# Patient Record
Sex: FEMALE | Race: BLACK OR AFRICAN AMERICAN | NOT HISPANIC OR LATINO | Employment: UNEMPLOYED | URBAN - METROPOLITAN AREA
[De-identification: names, ages, dates, MRNs, and addresses within clinical notes are randomized per-mention and may not be internally consistent; named-entity substitution may affect disease eponyms.]

---

## 2017-01-06 ENCOUNTER — CLINICAL SUPPORT (OUTPATIENT)
Dept: PEDIATRIC CARDIOLOGY | Facility: CLINIC | Age: 42
End: 2017-01-06
Payer: MEDICAID

## 2017-01-06 ENCOUNTER — OFFICE VISIT (OUTPATIENT)
Dept: PEDIATRIC CARDIOLOGY | Facility: CLINIC | Age: 42
End: 2017-01-06
Attending: PEDIATRICS
Payer: MEDICAID

## 2017-01-06 VITALS
BODY MASS INDEX: 38.09 KG/M2 | HEIGHT: 68 IN | SYSTOLIC BLOOD PRESSURE: 114 MMHG | DIASTOLIC BLOOD PRESSURE: 68 MMHG | WEIGHT: 251.31 LBS | HEART RATE: 83 BPM

## 2017-01-06 DIAGNOSIS — Q21.20 AV CANAL: Primary | ICD-10-CM

## 2017-01-06 DIAGNOSIS — O35.8XX0 OTHER KNOWN OR SUSPECTED FETAL ABNORMALITY, NOT ELSEWHERE CLASSIFIED, AFFECTING MANAGEMENT OF MOTHER, ANTEPARTUM CONDITION OR COMPLICATION: Primary | ICD-10-CM

## 2017-01-06 DIAGNOSIS — O35.8XX0 OTHER KNOWN OR SUSPECTED FETAL ABNORMALITY, NOT ELSEWHERE CLASSIFIED, AFFECTING MANAGEMENT OF MOTHER, ANTEPARTUM CONDITION OR COMPLICATION: ICD-10-CM

## 2017-01-06 PROCEDURE — 99213 OFFICE O/P EST LOW 20 MIN: CPT | Mod: 25,S$PBB,, | Performed by: PEDIATRICS

## 2017-01-06 PROCEDURE — 76828 ECHO EXAM OF FETAL HEART: CPT | Mod: 26,S$PBB,, | Performed by: PEDIATRICS

## 2017-01-06 PROCEDURE — 76825 ECHO EXAM OF FETAL HEART: CPT | Mod: PBBFAC | Performed by: PEDIATRICS

## 2017-01-06 PROCEDURE — 99213 OFFICE O/P EST LOW 20 MIN: CPT | Mod: PBBFAC | Performed by: PEDIATRICS

## 2017-01-06 PROCEDURE — 76826 ECHO EXAM OF FETAL HEART: CPT | Mod: PBBFAC | Performed by: PEDIATRICS

## 2017-01-06 PROCEDURE — 93325 DOPPLER ECHO COLOR FLOW MAPG: CPT | Mod: 26,S$PBB,, | Performed by: PEDIATRICS

## 2017-01-06 PROCEDURE — 93325 DOPPLER ECHO COLOR FLOW MAPG: CPT | Mod: PBBFAC | Performed by: PEDIATRICS

## 2017-01-06 PROCEDURE — 99999 PR PBB SHADOW E&M-EST. PATIENT-LVL III: CPT | Mod: PBBFAC,,, | Performed by: PEDIATRICS

## 2017-01-06 PROCEDURE — 76826 ECHO EXAM OF FETAL HEART: CPT | Mod: 26,S$PBB,, | Performed by: PEDIATRICS

## 2017-01-06 RX ORDER — NAPROXEN SODIUM 220 MG/1
81 TABLET, FILM COATED ORAL DAILY
Status: ON HOLD | COMMUNITY
End: 2017-04-02 | Stop reason: HOSPADM

## 2017-01-10 NOTE — PROGRESS NOTES
"Ms. Russell  is a 41 y.o. year old  , referred by Dr. Nunes because of the history of type II diabetes mellitus.  This patient was first seen in our fetal clinic in [place] on 2016.  Although this was a technically difficult study, it was our impression that Ms. Russell had an abnormal fetal echocardiogram with findings suggestive of a balanced AV canal defect (most likely) or an isolated inlet VSD.  She returned to our [facility] clinic today for scheduled follow up.    The patient presented at approximately 26 3/7weeks gestation.  The patient denied any complaints.  Free fetal DNA screen was CONSISTENT WITH TRISOMY 21    Past medical history:  Type II diabetes mellitus.  Past surgical history: S/P C/S x 2.  Past gestational history: The patient has two healthy children.  She also has a history of one ectopic pregnancy and two second trimester IUFD's.    Family history: Negative for congenital heart disease, and sudden death during childhood.    Medications:   Outpatient Encounter Prescriptions as of 2017   Medication Sig Dispense Refill    ACCU-CHEK FASTCLIX Misc       aspirin 81 MG Chew Take 81 mg by mouth once daily.      BD INSULIN SYRINGE HALF UNIT 0.3 mL 31 gauge x 5/16" Syrg       BD INSULIN SYRINGE HALF UNIT 0.3 mL 31 gauge x 5/16" Syrg       NESTABS 32-1,000 mg-mcg Tab       NOVOLIN N 100 unit/mL injection       NOVOLIN R 100 unit/mL injection       ONETOUCH ULTRA TEST Strp       ONETOUCH ULTRAMINI kit        No facility-administered encounter medications on file as of 2017.        Allergies: Review of patient's allergies indicates no known allergies.    Blood pressure 114/68, pulse 83, height 5' 7.91" (1.725 m), weight 114 kg (251 lb 5.2 oz).    Fetal echocardiogram was again technically difficult due to patient size and fetal position.  A four chamber fetal heart with situs solitus was seen.  The ventricles appeared to be equal in size.  The contractility of both " ventricles was good.  The fetal heart rate was within the normal range, and regular.  There appeared to be a balanced AV canal defect with large inlet VSD and small septum primum ASD.  There were normally related great arteries seen.  The ductal and aortic arch were visualized, and appeared to be patent.  There was no pleural or pericardial effusion seen.    Doppler analysis revealed a three vessel umbilical cord, with normal flow patterns, and velocities by Doppler.  There was a normal flow pattern seen in the ductus venosus.  There was evidence of normal systemic, and pulmonary venous return seen.  There were normal inflow patterns seen across the AV-valves, without significant insufficiency.  The right and left ventricular outflow tract, and ductal and aortic arch appeared to be unobstructed.    Impression:  Although this was again a technically difficult study, it is our impression that Ms. Russell had an abnormal fetal echocardiogram with findings suggestive of a balanced AV canal defect.  As you know, small defects, and coarctation of the aorta cannot always be ruled out on fetal echocardiogram.  We discussed our findings with the patient and her partner, reviewed our images, and answered their questions. We also discussed the limitations of fetal echocardiography.  We discussed the more common combination of Down syndrome and endocardial cushion defect.  We briefly discussed the likely course in the  period and the possibility of elective surgery between 4 and 6 months of age, if the child feeds well and has adequate weight gain.  We suggested  follow up in our [facility] clinic in approximately 6 weeks, but, of course, we will always be available to reevaluate this patient sooner, if needed.  Time spent: 40 minutes, 50% dedicated to counseling.

## 2017-01-31 ENCOUNTER — DOCUMENTATION ONLY (OUTPATIENT)
Dept: OBSTETRICS AND GYNECOLOGY | Facility: HOSPITAL | Age: 42
End: 2017-01-31

## 2017-01-31 NOTE — PROGRESS NOTES
[facility]    Indication: Fetal heart defect - ffDNA consistent with Trisomy 21  Abnormal maternal serum screen  Type 2 DM  AMA  Two second trimester IUFD  Prior C/S x 2  Prior wound dehiscience  BMI 33.   ____________________________________________________________________________  History: Age: 42 years. Maternal age at EDC: 42 years. : 6 Para: 2. Previous pregnancies: Living children: 2.   Current Pregnancy: Pre- pregnancy data: Weight 232 lbs. Height 5 ft 10 ins. BMI 33.3.  Obstetric History: Mode of last delivery:  Section.  ____________________________________________________________________________  Dating:  Stated EDC:  EDC: 2017 GA by stated EDC: 30w0d  Current Scan on: 2017 EDC: 04/10/2017 GA by current scan: 30w1d  Best Overall Assessment: 2017 EDC: 2017 Assessed GA: 30w0d  The calculation of the gestational age by current scan was based on BPD, OFD, HC, AC and FL.  The Best Overall Assessment is based on the stated EDC.  ____________________________________________________________________________  General Evaluation:  Fetal heart activity: present. Fetal heart rate: 158 bpm.   Presentation: cephalic.   Fetal movement: visible.   Amniotic Fluid: normal. HANS  11.8 cm.   Cord: 3 vessels.   Placenta: anterior.     ____________________________________________________________________________  Anatomy Scan:  Biometry:  Fetal Measurements used for the estimation of the gestational age are bolded.  BPD 74.8 mm 38th% 30w0d (29w2d to 30w5d) (Hadlock)  .7 mm 22nd% 30w2d (27w2d to 33w2d) (Hadlock)  .7 mm 52nd% 30w2d (29w2d to 31w2d) (Hadlock)  FL 59.1 mm 59th% 30w6d (27w6d to 33w6d) (Hadlock)  OFD 98.1 mm 34th% 29w3d  (Nicolaides)  HC/AC Ratio 1.057  36th%   (Nicolaides)  FL/AC Ratio 0.226  <5th%   (Hadlock)  BPD/FL Ratio 1.266  <5th%   (Nicolaides)  EFW (lbs/oz) 3 lbs 7 ozs (Hadlock (BPD-HC-AC-FL))  EFW (g) 1574 g 59th%    Fetal  "Anatomy:   Normal Abnormal Suboptimal Normal Abnormal Suboptimal  Head X o o Abdominal Wall X o o  Brain X o o GI- Tract X o o  Face o o o Kidneys X o o  Spine X o o Bladder X o o  Neck/Skin X o o Extremities o o o  Thorax X o o Skeleton X o o  Heart o X o Genitalia X MALE o o    Details: Face: Sub-optimal.   Heart:   Atrium-ventricular septal defect.  Extremities: Sub-optimal.    Summary of Ultrasound Findings:  U/S machine: SaveFans! 9.     ____________________________________________________________________________  Fetal Wellbeing Assessment:  Amniotic fluid: normal. HANS: 11.8 cm.   Biophysical Profile: Fetal body movements: normal (2), Fetal tone: normal (2), Fetal breathing movements: normal (2), Amniotic fluid volume: normal (2). Score 8 / 8.     ____________________________________________________________________________  Consultation:  Type of Consultation: Maternal Fetal Medicine Evaluation  Consultant: Shilo Nunes III, MD  FUV : Dr. Sawant for Type 2 DM, Two second trimester IUFD, AMA, Fetal heart defect - ffDNA consistent with Trisomy 21    40y/o   EDC 17; 30w    Chart reviewed  My last note is reviewed  Pt interviewed and examined  Ultrasound performed  Maternal Serum ffDNA screen consistent with TRISOMY 21  Interval problems- Abdominal pain- "sore feeling" all last night, pt was able to sleep  No leaking, bleeding or discharge  Good FM    Fetal Cards evaluation on 1//10/17  findings suggestive of a balanced AV canal defect (most likely)  more common combination of Down syndrome and endocardial cushion defect.   Follow-up appt next week    OB HX:   "Fabien" 10#4oz, C/S for failure to progress, baby on ABX for a few days after delivery,   - ectopic pregnancy   "Annalee" 7#, repeat C/S, In [facility] for 1 month prior to delivery for unstable BS, readmitted for wound dehiscence  - "Lloyd" stillborn at 6mth, about 500 grams; went to dr for no fetal movement,  induction of " "labor; nuchal cord x 4 2016- "Benjamin" stillborn at 4mth, < 400 grams; went to dr for no fetal movement, Pt reports "work up done"; no etiology found    Type 2 DM:  BS checks QID - did not bring her log  FBS - 118 this AM  After meals - 130-140  Insulin-   Novalin N 34u in am and 24u at night (increased) - pt adjusted  Novalin R 24u in am and 10u at meals (2-3 times per day) (increased)    Meds:  81mg aspirin  Nestabs    Surgical HX:  ectopic preg  C/S x 2    Social HX:  non-smoker  student- Business administration.  ____________________________________________________________________________  Maternal Assessment:  Followup examination.   Weight: 249 lb (113.0 kg), weight gained: 15 lbs (weight gained: 7.7 kg).   Blood pressure: 121 / 71 mmHg.   Pulse: 89 b/min.   ____________________________________________________________________________  Report Summary:  Impression:   Fetus with Trisomy 21 and AVCD  Type 2 DM in sub-optimal control  Reassurring fetal growth and surveillance,   Normal amniotic fluid  Hx of wound dehiscience.   Recommendations:   Continue efforts at glucose control  Pt will see Peds Cards next week and we will ask them to recommend best location for delivery  Would plan for delivery between 37-38 weeks  With your permission, we would like to re-evaluate fetal growth and well being and maternal health in 6 weeks    "

## 2017-02-07 ENCOUNTER — OFFICE VISIT (OUTPATIENT)
Dept: PEDIATRIC CARDIOLOGY | Facility: CLINIC | Age: 42
End: 2017-02-07
Attending: PEDIATRICS
Payer: MEDICAID

## 2017-02-07 VITALS
DIASTOLIC BLOOD PRESSURE: 66 MMHG | WEIGHT: 255.75 LBS | BODY MASS INDEX: 37.88 KG/M2 | HEART RATE: 83 BPM | HEIGHT: 69 IN | SYSTOLIC BLOOD PRESSURE: 115 MMHG

## 2017-02-07 DIAGNOSIS — O35.8XX0 OTHER KNOWN OR SUSPECTED FETAL ABNORMALITY, NOT ELSEWHERE CLASSIFIED, AFFECTING MANAGEMENT OF MOTHER, ANTEPARTUM CONDITION OR COMPLICATION: ICD-10-CM

## 2017-02-07 DIAGNOSIS — O35.8XX0 OTHER KNOWN OR SUSPECTED FETAL ABNORMALITY, NOT ELSEWHERE CLASSIFIED, AFFECTING MANAGEMENT OF MOTHER, ANTEPARTUM CONDITION OR COMPLICATION: Primary | ICD-10-CM

## 2017-02-07 PROCEDURE — 99213 OFFICE O/P EST LOW 20 MIN: CPT | Mod: 25,S$PBB,, | Performed by: PEDIATRICS

## 2017-02-07 PROCEDURE — 99212 OFFICE O/P EST SF 10 MIN: CPT | Mod: PBBFAC | Performed by: PEDIATRICS

## 2017-02-07 PROCEDURE — 99999 PR PBB SHADOW E&M-EST. PATIENT-LVL II: CPT | Mod: PBBFAC,,, | Performed by: PEDIATRICS

## 2017-02-07 PROCEDURE — 76827 ECHO EXAM OF FETAL HEART: CPT | Mod: 26,S$PBB,, | Performed by: PEDIATRICS

## 2017-02-07 PROCEDURE — 93325 DOPPLER ECHO COLOR FLOW MAPG: CPT | Mod: 26,S$PBB,, | Performed by: PEDIATRICS

## 2017-02-07 PROCEDURE — 76825 ECHO EXAM OF FETAL HEART: CPT | Mod: 26,S$PBB,, | Performed by: PEDIATRICS

## 2017-02-07 NOTE — LETTER
February 8, 2017      Other  [address]  [place]  [place] [address]           [facility]  [address] [place] [address]  Phone: [phone]          Patient: Stephanie Russell   MR Number: [id number]   YOB: 1975   Date of Visit: 2/7/2017       Dear Other:    Thank you for referring Stephanie Russell to me for evaluation. Attached you will find relevant portions of my assessment and plan of care.    If you have questions, please do not hesitate to call me. I look forward to following Stephanie Russell along with you.    Sincerely,    Jimbo Johnson MD    Enclosure  CC:  No Recipients    If you would like to receive this communication electronically, please contact [email or web address] or [phone] to request more information on [employer] access.    For providers and/or their staff who would like to refer a patient to [facility], please contact us through our one-stop-shop provider referral line, [facility], at [phone].    If you feel you have received this communication in error or would no longer like to receive these types of communications, please e-mail [email or web address]

## 2017-02-08 NOTE — PROGRESS NOTES
"Ms. Russell  is a 42 y.o. year old  , referred by Dr. Nunes because of the history of type II diabetes mellitus.  This patient was first seen in our fetal clinic in [place] on 2016 and more recently on 17 at our [facility] clinic.  Although these were technically difficult studies, it was our impression that Ms. Russell had an abnormal fetal echocardiogram with findings suggestive of a balanced AV canal defect.  She returned to our [place] clinic for scheduled follow up.    The patient presented at approximately 31 weeks gestation.  The patient denied any complaints.  Free fetal DNA screen was CONSISTENT WITH TRISOMY 21    Past medical history:  Type II diabetes mellitus.  Past surgical history: S/P C/S x 2.  Past gestational history: The patient has two healthy children.  She also has a history of one ectopic pregnancy and two second trimester IUFD's.    Family history: Negative for congenital heart disease, and sudden death during childhood.    Medications:   Outpatient Encounter Prescriptions as of 2017   Medication Sig Dispense Refill    ACCU-CHEK FASTCLIX Misc       aspirin 81 MG Chew Take 81 mg by mouth once daily.      BD INSULIN SYRINGE HALF UNIT 0.3 mL 31 gauge x 5/16" Syrg       BD INSULIN SYRINGE HALF UNIT 0.3 mL 31 gauge x 5/16" Syrg       NESTABS 32-1,000 mg-mcg Tab       NOVOLIN N 100 unit/mL injection       NOVOLIN R 100 unit/mL injection       ONETOUCH ULTRA TEST Strp       ONETOUCH ULTRAMINI kit        No facility-administered encounter medications on file as of 2017.        Allergies: Review of patient's allergies indicates no known allergies.    Blood pressure 115/66, pulse 83, height 5' 8.5" (1.74 m), weight 116 kg (255 lb 11.7 oz).    Fetal echocardiogram was again technically difficult due to patient size and fetal position.  A four chamber fetal heart with situs solitus was seen.  The ventricles appeared to be equal in size.  The contractility of both " ventricles was good.  The fetal heart rate was within the normal range, and regular.  There appeared to be a balanced AV canal defect with large inlet VSD and small septum primum ASD.  There were normally related great arteries seen.  The ductal and aortic arch could not be visualized.  There was no pleural or pericardial effusion seen.    Doppler analysis revealed a three vessel umbilical cord, with normal flow patterns, and velocities by Doppler.  There was a normal flow pattern seen in the ductus venosus.  There was evidence of normal systemic, and pulmonary venous return seen.  There were normal inflow patterns seen across the AV-valves, without significant insufficiency.    Impression:  Although this was again a technically difficult study, it is our impression that Ms. Russell had an abnormal fetal echocardiogram with findings suggestive of a balanced AV canal defect.  As you know, small defects, and coarctation of the aorta cannot always be ruled out on fetal echocardiogram.  We discussed our findings with the patient and her partner, reviewed our images, and answered their questions.  We discussed the more common combination of Down syndrome and endocardial cushion defect.  We briefly discussed the likely course in the  period and the possibility of elective surgery between 4 and 6 months of age, if the child feeds well and has adequate weight gain.  The patient is scheduled for delivery via  on 3/21, most likely at [facility].  Of course, the child should be evaluated by Pediatric Cardiology shortly after birth.  No further follow up is scheduled in our clinic, but, of course, we will always be available to reevaluate this patient, if needed.  Time spent: 30 minutes, 50% dedicated to counseling.

## 2017-03-07 PROBLEM — O09.523 ELDERLY MULTIGRAVIDA IN THIRD TRIMESTER: Status: ACTIVE | Noted: 2017-03-07

## 2017-03-07 PROBLEM — O35.13X0 FETAL TRISOMY 21 AFFECTING CARE OF MOTHER, ANTEPARTUM: Status: ACTIVE | Noted: 2017-03-07

## 2017-03-07 PROBLEM — O24.113 PRE-EXISTING TYPE 2 DIABETES MELLITUS DURING PREGNANCY IN THIRD TRIMESTER: Status: ACTIVE | Noted: 2017-03-07

## 2017-03-09 ENCOUNTER — TELEPHONE (OUTPATIENT)
Dept: MATERNAL FETAL MEDICINE | Facility: CLINIC | Age: 42
End: 2017-03-09

## 2017-03-09 NOTE — TELEPHONE ENCOUNTER
Spoke with patient this am and informed her of Date and Time of  on  at 7:00 am.  Patient instructed to take her PM dose of insulin and to have nothing eat or drink after midnight and to be on L&D for 5:30 am.  Patient verbalized her understanding.

## 2017-03-28 ENCOUNTER — ANESTHESIA EVENT (OUTPATIENT)
Dept: OBSTETRICS AND GYNECOLOGY | Facility: OTHER | Age: 42
End: 2017-03-28
Payer: MEDICAID

## 2017-03-28 ENCOUNTER — ANESTHESIA (OUTPATIENT)
Dept: OBSTETRICS AND GYNECOLOGY | Facility: OTHER | Age: 42
End: 2017-03-28
Payer: MEDICAID

## 2017-03-28 ENCOUNTER — HOSPITAL ENCOUNTER (INPATIENT)
Facility: OTHER | Age: 42
LOS: 5 days | Discharge: HOME | End: 2017-04-02
Attending: OBSTETRICS & GYNECOLOGY | Admitting: OBSTETRICS & GYNECOLOGY
Payer: MEDICAID

## 2017-03-28 ENCOUNTER — SURGERY (OUTPATIENT)
Age: 42
End: 2017-03-28

## 2017-03-28 ENCOUNTER — RESEARCH ENCOUNTER (OUTPATIENT)
Dept: RESEARCH | Facility: HOSPITAL | Age: 42
End: 2017-03-28

## 2017-03-28 DIAGNOSIS — O24.113 PRE-EXISTING TYPE 2 DIABETES MELLITUS DURING PREGNANCY IN THIRD TRIMESTER: ICD-10-CM

## 2017-03-28 DIAGNOSIS — Z98.891 STATUS POST REPEAT LOW TRANSVERSE CESAREAN SECTION: ICD-10-CM

## 2017-03-28 DIAGNOSIS — O35.8XX0 OTHER KNOWN OR SUSPECTED FETAL ABNORMALITY, NOT ELSEWHERE CLASSIFIED, AFFECTING MANAGEMENT OF MOTHER, ANTEPARTUM CONDITION OR COMPLICATION: Primary | ICD-10-CM

## 2017-03-28 DIAGNOSIS — O35.13X0 FETAL TRISOMY 21 AFFECTING CARE OF MOTHER, ANTEPARTUM, NOT APPLICABLE OR UNSPECIFIED FETUS: ICD-10-CM

## 2017-03-28 DIAGNOSIS — O35.13X0 TRISOMY 21 OF FETUS IN CURRENT SINGLETON PREGNANCY: ICD-10-CM

## 2017-03-28 DIAGNOSIS — O09.523 ELDERLY MULTIGRAVIDA IN THIRD TRIMESTER: ICD-10-CM

## 2017-03-28 PROBLEM — O34.219 HISTORY OF CESAREAN DELIVERY AFFECTING PREGNANCY: Status: ACTIVE | Noted: 2017-03-28

## 2017-03-28 LAB
ABO + RH BLD: NORMAL
BASOPHILS # BLD AUTO: 0.01 K/UL
BASOPHILS NFR BLD: 0.1 %
BLD GP AB SCN CELLS X3 SERPL QL: NORMAL
DIFFERENTIAL METHOD: ABNORMAL
EOSINOPHIL # BLD AUTO: 0.1 K/UL
EOSINOPHIL NFR BLD: 1.7 %
ERYTHROCYTE [DISTWIDTH] IN BLOOD BY AUTOMATED COUNT: 15.6 %
HBV SURFACE AG SERPL QL IA: NEGATIVE
HCT VFR BLD AUTO: 38.8 %
HGB BLD-MCNC: 13.2 G/DL
HIV 1+2 AB+HIV1 P24 AG SERPL QL IA: NEGATIVE
HIV1+2 IGG SERPL QL IA.RAPID: NEGATIVE
LYMPHOCYTES # BLD AUTO: 1.9 K/UL
LYMPHOCYTES NFR BLD: 27.6 %
MCH RBC QN AUTO: 27.7 PG
MCHC RBC AUTO-ENTMCNC: 34 %
MCV RBC AUTO: 82 FL
MONOCYTES # BLD AUTO: 0.6 K/UL
MONOCYTES NFR BLD: 7.8 %
NEUTROPHILS # BLD AUTO: 4.4 K/UL
NEUTROPHILS NFR BLD: 62.5 %
PLATELET # BLD AUTO: 245 K/UL
PMV BLD AUTO: 10 FL
POCT GLUCOSE: 102 MG/DL (ref 70–110)
POCT GLUCOSE: 59 MG/DL (ref 70–110)
POCT GLUCOSE: 64 MG/DL (ref 70–110)
POCT GLUCOSE: 66 MG/DL (ref 70–110)
POCT GLUCOSE: 67 MG/DL (ref 70–110)
POCT GLUCOSE: 74 MG/DL (ref 70–110)
POCT GLUCOSE: 78 MG/DL (ref 70–110)
RBC # BLD AUTO: 4.76 M/UL
WBC # BLD AUTO: 7.02 K/UL

## 2017-03-28 PROCEDURE — 85025 COMPLETE CBC W/AUTO DIFF WBC: CPT

## 2017-03-28 PROCEDURE — 87340 HEPATITIS B SURFACE AG IA: CPT

## 2017-03-28 PROCEDURE — 25000003 PHARM REV CODE 250: Performed by: STUDENT IN AN ORGANIZED HEALTH CARE EDUCATION/TRAINING PROGRAM

## 2017-03-28 PROCEDURE — 63600175 PHARM REV CODE 636 W HCPCS: Performed by: OBSTETRICS & GYNECOLOGY

## 2017-03-28 PROCEDURE — 63600175 PHARM REV CODE 636 W HCPCS: Performed by: ANESTHESIOLOGY

## 2017-03-28 PROCEDURE — 86900 BLOOD TYPING SEROLOGIC ABO: CPT

## 2017-03-28 PROCEDURE — 25000003 PHARM REV CODE 250: Performed by: ANESTHESIOLOGY

## 2017-03-28 PROCEDURE — 59514 CESAREAN DELIVERY ONLY: CPT | Mod: ,,, | Performed by: ANESTHESIOLOGY

## 2017-03-28 PROCEDURE — 37000009 HC ANESTHESIA EA ADD 15 MINS: Performed by: OBSTETRICS & GYNECOLOGY

## 2017-03-28 PROCEDURE — 63600175 PHARM REV CODE 636 W HCPCS

## 2017-03-28 PROCEDURE — 25000003 PHARM REV CODE 250: Performed by: OBSTETRICS & GYNECOLOGY

## 2017-03-28 PROCEDURE — 86703 HIV-1/HIV-2 1 RESULT ANTBDY: CPT

## 2017-03-28 PROCEDURE — 86592 SYPHILIS TEST NON-TREP QUAL: CPT

## 2017-03-28 PROCEDURE — 86701 HIV-1ANTIBODY: CPT

## 2017-03-28 PROCEDURE — 27800517 HC TRAY,EPIDURAL-CONTINUOUS: Performed by: ANESTHESIOLOGY

## 2017-03-28 PROCEDURE — 36000685 HC CESAREAN SECTION LEVEL I

## 2017-03-28 PROCEDURE — 59514 CESAREAN DELIVERY ONLY: CPT | Mod: AT,,, | Performed by: OBSTETRICS & GYNECOLOGY

## 2017-03-28 PROCEDURE — 11000001 HC ACUTE MED/SURG PRIVATE ROOM

## 2017-03-28 PROCEDURE — 86762 RUBELLA ANTIBODY: CPT

## 2017-03-28 PROCEDURE — 51702 INSERT TEMP BLADDER CATH: CPT

## 2017-03-28 PROCEDURE — 88307 TISSUE EXAM BY PATHOLOGIST: CPT | Mod: 26,,, | Performed by: PATHOLOGY

## 2017-03-28 PROCEDURE — 27201127 HC VACUUM EXTRACTOR

## 2017-03-28 PROCEDURE — 27200033

## 2017-03-28 PROCEDURE — 88307 TISSUE EXAM BY PATHOLOGIST: CPT | Performed by: PATHOLOGY

## 2017-03-28 PROCEDURE — 37000008 HC ANESTHESIA 1ST 15 MINUTES: Performed by: OBSTETRICS & GYNECOLOGY

## 2017-03-28 PROCEDURE — 86901 BLOOD TYPING SEROLOGIC RH(D): CPT

## 2017-03-28 RX ORDER — ONDANSETRON 8 MG/1
8 TABLET, ORALLY DISINTEGRATING ORAL EVERY 8 HOURS PRN
Status: DISCONTINUED | OUTPATIENT
Start: 2017-03-28 | End: 2017-04-02 | Stop reason: HOSPADM

## 2017-03-28 RX ORDER — SODIUM CHLORIDE, SODIUM LACTATE, POTASSIUM CHLORIDE, CALCIUM CHLORIDE 600; 310; 30; 20 MG/100ML; MG/100ML; MG/100ML; MG/100ML
INJECTION, SOLUTION INTRAVENOUS CONTINUOUS PRN
Status: DISCONTINUED | OUTPATIENT
Start: 2017-03-28 | End: 2017-03-28

## 2017-03-28 RX ORDER — ACETAMINOPHEN 10 MG/ML
INJECTION, SOLUTION INTRAVENOUS
Status: DISCONTINUED | OUTPATIENT
Start: 2017-03-28 | End: 2017-03-28

## 2017-03-28 RX ORDER — MISOPROSTOL 200 UG/1
200 TABLET ORAL EVERY 6 HOURS
Status: DISCONTINUED | OUTPATIENT
Start: 2017-03-28 | End: 2017-03-29

## 2017-03-28 RX ORDER — OXYCODONE HYDROCHLORIDE 5 MG/1
5 TABLET ORAL EVERY 4 HOURS PRN
Status: DISCONTINUED | OUTPATIENT
Start: 2017-03-28 | End: 2017-04-02 | Stop reason: HOSPADM

## 2017-03-28 RX ORDER — GLUCAGON 1 MG
1 KIT INJECTION
Status: DISCONTINUED | OUTPATIENT
Start: 2017-03-28 | End: 2017-04-02 | Stop reason: HOSPADM

## 2017-03-28 RX ORDER — METOCLOPRAMIDE HYDROCHLORIDE 5 MG/ML
10 INJECTION INTRAMUSCULAR; INTRAVENOUS ONCE
Status: COMPLETED | OUTPATIENT
Start: 2017-03-28 | End: 2017-03-28

## 2017-03-28 RX ORDER — SODIUM CITRATE AND CITRIC ACID MONOHYDRATE 334; 500 MG/5ML; MG/5ML
30 SOLUTION ORAL ONCE
Status: COMPLETED | OUTPATIENT
Start: 2017-03-28 | End: 2017-03-28

## 2017-03-28 RX ORDER — MISOPROSTOL 200 UG/1
TABLET ORAL
Status: DISPENSED
Start: 2017-03-28 | End: 2017-03-28

## 2017-03-28 RX ORDER — ONDANSETRON 2 MG/ML
INJECTION INTRAMUSCULAR; INTRAVENOUS
Status: DISCONTINUED | OUTPATIENT
Start: 2017-03-28 | End: 2017-03-28

## 2017-03-28 RX ORDER — SODIUM CITRATE AND CITRIC ACID MONOHYDRATE 334; 500 MG/5ML; MG/5ML
SOLUTION ORAL
Status: DISPENSED
Start: 2017-03-28 | End: 2017-03-28

## 2017-03-28 RX ORDER — CEFAZOLIN SODIUM 2 G/50ML
2 SOLUTION INTRAVENOUS
Status: DISCONTINUED | OUTPATIENT
Start: 2017-03-28 | End: 2017-03-28

## 2017-03-28 RX ORDER — ONDANSETRON 2 MG/ML
4 INJECTION INTRAMUSCULAR; INTRAVENOUS EVERY 12 HOURS PRN
Status: DISCONTINUED | OUTPATIENT
Start: 2017-03-28 | End: 2017-04-02 | Stop reason: HOSPADM

## 2017-03-28 RX ORDER — ACETAMINOPHEN 325 MG/1
650 TABLET ORAL EVERY 6 HOURS
Status: DISPENSED | OUTPATIENT
Start: 2017-03-28 | End: 2017-03-29

## 2017-03-28 RX ORDER — MISOPROSTOL 200 UG/1
800 TABLET ORAL
Status: DISCONTINUED | OUTPATIENT
Start: 2017-03-28 | End: 2017-03-28

## 2017-03-28 RX ORDER — SODIUM CHLORIDE, SODIUM LACTATE, POTASSIUM CHLORIDE, CALCIUM CHLORIDE 600; 310; 30; 20 MG/100ML; MG/100ML; MG/100ML; MG/100ML
INJECTION, SOLUTION INTRAVENOUS CONTINUOUS
Status: DISCONTINUED | OUTPATIENT
Start: 2017-03-28 | End: 2017-03-28

## 2017-03-28 RX ORDER — ZOLPIDEM TARTRATE 5 MG/1
5 TABLET ORAL NIGHTLY PRN
Status: DISCONTINUED | OUTPATIENT
Start: 2017-03-28 | End: 2017-04-02 | Stop reason: HOSPADM

## 2017-03-28 RX ORDER — DIPHENHYDRAMINE HCL 25 MG
25 CAPSULE ORAL EVERY 4 HOURS PRN
Status: DISCONTINUED | OUTPATIENT
Start: 2017-03-28 | End: 2017-04-02 | Stop reason: HOSPADM

## 2017-03-28 RX ORDER — FENTANYL CITRATE 50 UG/ML
INJECTION, SOLUTION INTRAMUSCULAR; INTRAVENOUS
Status: DISCONTINUED | OUTPATIENT
Start: 2017-03-28 | End: 2017-03-28

## 2017-03-28 RX ORDER — OXYCODONE AND ACETAMINOPHEN 10; 325 MG/1; MG/1
1 TABLET ORAL EVERY 4 HOURS PRN
Status: DISCONTINUED | OUTPATIENT
Start: 2017-03-29 | End: 2017-04-02 | Stop reason: HOSPADM

## 2017-03-28 RX ORDER — IBUPROFEN 200 MG
24 TABLET ORAL
Status: DISCONTINUED | OUTPATIENT
Start: 2017-03-28 | End: 2017-04-02 | Stop reason: HOSPADM

## 2017-03-28 RX ORDER — METHYLERGONOVINE MALEATE 0.2 MG/ML
INJECTION INTRAVENOUS
Status: DISCONTINUED
Start: 2017-03-28 | End: 2017-03-28 | Stop reason: WASHOUT

## 2017-03-28 RX ORDER — FAMOTIDINE 10 MG/ML
20 INJECTION INTRAVENOUS ONCE
Status: COMPLETED | OUTPATIENT
Start: 2017-03-28 | End: 2017-03-28

## 2017-03-28 RX ORDER — METOCLOPRAMIDE HYDROCHLORIDE 5 MG/ML
INJECTION INTRAMUSCULAR; INTRAVENOUS
Status: DISPENSED
Start: 2017-03-28 | End: 2017-03-28

## 2017-03-28 RX ORDER — OXYTOCIN 10 [USP'U]/ML
INJECTION, SOLUTION INTRAMUSCULAR; INTRAVENOUS
Status: COMPLETED
Start: 2017-03-28 | End: 2017-03-28

## 2017-03-28 RX ORDER — FAMOTIDINE 10 MG/ML
INJECTION INTRAVENOUS
Status: DISPENSED
Start: 2017-03-28 | End: 2017-03-28

## 2017-03-28 RX ORDER — IBUPROFEN 200 MG
16 TABLET ORAL
Status: DISCONTINUED | OUTPATIENT
Start: 2017-03-28 | End: 2017-04-02 | Stop reason: HOSPADM

## 2017-03-28 RX ORDER — PHENYLEPHRINE HYDROCHLORIDE 10 MG/ML
INJECTION INTRAVENOUS
Status: DISCONTINUED | OUTPATIENT
Start: 2017-03-28 | End: 2017-03-28

## 2017-03-28 RX ORDER — DEXTROSE, SODIUM CHLORIDE, SODIUM LACTATE, POTASSIUM CHLORIDE, AND CALCIUM CHLORIDE 5; .6; .31; .03; .02 G/100ML; G/100ML; G/100ML; G/100ML; G/100ML
INJECTION, SOLUTION INTRAVENOUS CONTINUOUS
Status: DISCONTINUED | OUTPATIENT
Start: 2017-03-28 | End: 2017-04-02 | Stop reason: HOSPADM

## 2017-03-28 RX ORDER — OXYCODONE AND ACETAMINOPHEN 5; 325 MG/1; MG/1
1 TABLET ORAL EVERY 4 HOURS PRN
Status: DISCONTINUED | OUTPATIENT
Start: 2017-03-29 | End: 2017-04-02 | Stop reason: HOSPADM

## 2017-03-28 RX ORDER — NAPROXEN SODIUM 220 MG/1
81 TABLET, FILM COATED ORAL DAILY
Status: DISCONTINUED | OUTPATIENT
Start: 2017-03-29 | End: 2017-03-29

## 2017-03-28 RX ORDER — CARBOPROST TROMETHAMINE 250 UG/ML
INJECTION, SOLUTION INTRAMUSCULAR
Status: DISCONTINUED
Start: 2017-03-28 | End: 2017-03-28 | Stop reason: WASHOUT

## 2017-03-28 RX ORDER — BUPIVACAINE HYDROCHLORIDE 7.5 MG/ML
INJECTION, SOLUTION EPIDURAL; RETROBULBAR
Status: DISCONTINUED | OUTPATIENT
Start: 2017-03-28 | End: 2017-03-28

## 2017-03-28 RX ORDER — KETOROLAC TROMETHAMINE 30 MG/ML
INJECTION, SOLUTION INTRAMUSCULAR; INTRAVENOUS
Status: DISCONTINUED | OUTPATIENT
Start: 2017-03-28 | End: 2017-03-28

## 2017-03-28 RX ORDER — IBUPROFEN 400 MG/1
800 TABLET ORAL EVERY 6 HOURS
Status: DISCONTINUED | OUTPATIENT
Start: 2017-03-29 | End: 2017-04-02 | Stop reason: HOSPADM

## 2017-03-28 RX ORDER — OXYCODONE HYDROCHLORIDE 5 MG/1
10 TABLET ORAL EVERY 4 HOURS PRN
Status: DISCONTINUED | OUTPATIENT
Start: 2017-03-28 | End: 2017-04-02 | Stop reason: HOSPADM

## 2017-03-28 RX ORDER — KETOROLAC TROMETHAMINE 30 MG/ML
30 INJECTION, SOLUTION INTRAMUSCULAR; INTRAVENOUS EVERY 6 HOURS
Status: COMPLETED | OUTPATIENT
Start: 2017-03-28 | End: 2017-03-29

## 2017-03-28 RX ORDER — AMOXICILLIN 250 MG
1 CAPSULE ORAL NIGHTLY PRN
Status: DISCONTINUED | OUTPATIENT
Start: 2017-03-28 | End: 2017-04-02 | Stop reason: HOSPADM

## 2017-03-28 RX ORDER — MORPHINE SULFATE 0.5 MG/ML
INJECTION, SOLUTION EPIDURAL; INTRATHECAL; INTRAVENOUS
Status: DISCONTINUED | OUTPATIENT
Start: 2017-03-28 | End: 2017-03-28

## 2017-03-28 RX ORDER — SIMETHICONE 80 MG
1 TABLET,CHEWABLE ORAL EVERY 6 HOURS PRN
Status: DISCONTINUED | OUTPATIENT
Start: 2017-03-28 | End: 2017-04-02 | Stop reason: HOSPADM

## 2017-03-28 RX ORDER — HYDROMORPHONE HYDROCHLORIDE 2 MG/ML
INJECTION, SOLUTION INTRAMUSCULAR; INTRAVENOUS; SUBCUTANEOUS
Status: DISCONTINUED | OUTPATIENT
Start: 2017-03-28 | End: 2017-03-28

## 2017-03-28 RX ADMIN — KETOROLAC TROMETHAMINE 30 MG: 30 INJECTION, SOLUTION INTRAMUSCULAR at 02:03

## 2017-03-28 RX ADMIN — MISOPROSTOL 200 MCG: 200 TABLET ORAL at 03:03

## 2017-03-28 RX ADMIN — METOCLOPRAMIDE 10 MG: 5 INJECTION, SOLUTION INTRAMUSCULAR; INTRAVENOUS at 06:03

## 2017-03-28 RX ADMIN — PHENYLEPHRINE HYDROCHLORIDE 100 MCG: 10 INJECTION INTRAVENOUS at 07:03

## 2017-03-28 RX ADMIN — OXYTOCIN 2 UNITS: 10 INJECTION, SOLUTION INTRAMUSCULAR; INTRAVENOUS at 07:03

## 2017-03-28 RX ADMIN — ACETAMINOPHEN 1000 MG: 10 INJECTION, SOLUTION INTRAVENOUS at 07:03

## 2017-03-28 RX ADMIN — OXYCODONE HYDROCHLORIDE 10 MG: 5 TABLET ORAL at 01:03

## 2017-03-28 RX ADMIN — KETOROLAC TROMETHAMINE 30 MG: 30 INJECTION, SOLUTION INTRAMUSCULAR at 08:03

## 2017-03-28 RX ADMIN — ACETAMINOPHEN 650 MG: 325 TABLET ORAL at 02:03

## 2017-03-28 RX ADMIN — SODIUM CHLORIDE, SODIUM LACTATE, POTASSIUM CHLORIDE, AND CALCIUM CHLORIDE: 600; 310; 30; 20 INJECTION, SOLUTION INTRAVENOUS at 08:03

## 2017-03-28 RX ADMIN — FAMOTIDINE 20 MG: 10 INJECTION INTRAVENOUS at 06:03

## 2017-03-28 RX ADMIN — SODIUM CHLORIDE, SODIUM LACTATE, POTASSIUM CHLORIDE, AND CALCIUM CHLORIDE: 600; 310; 30; 20 INJECTION, SOLUTION INTRAVENOUS at 06:03

## 2017-03-28 RX ADMIN — SODIUM CITRATE AND CITRIC ACID MONOHYDRATE 30 ML: 500; 334 SOLUTION ORAL at 06:03

## 2017-03-28 RX ADMIN — BUPIVACAINE HYDROCHLORIDE 1.6 ML: 7.5 INJECTION, SOLUTION EPIDURAL; RETROBULBAR at 06:03

## 2017-03-28 RX ADMIN — Medication 0.15 MG: at 06:03

## 2017-03-28 RX ADMIN — MISOPROSTOL 200 MCG: 200 TABLET ORAL at 09:03

## 2017-03-28 RX ADMIN — ACETAMINOPHEN 650 MG: 325 TABLET ORAL at 08:03

## 2017-03-28 RX ADMIN — SODIUM CHLORIDE, SODIUM LACTATE, POTASSIUM CHLORIDE, AND CALCIUM CHLORIDE: 600; 310; 30; 20 INJECTION, SOLUTION INTRAVENOUS at 07:03

## 2017-03-28 RX ADMIN — MISOPROSTOL 200 MCG: 200 TABLET ORAL at 10:03

## 2017-03-28 RX ADMIN — DIPHENHYDRAMINE HYDROCHLORIDE 25 MG: 25 CAPSULE ORAL at 03:03

## 2017-03-28 RX ADMIN — FENTANYL CITRATE 10 MCG: 50 INJECTION, SOLUTION INTRAMUSCULAR; INTRAVENOUS at 06:03

## 2017-03-28 RX ADMIN — AZITHROMYCIN MONOHYDRATE 500 MG: 500 INJECTION, POWDER, LYOPHILIZED, FOR SOLUTION INTRAVENOUS at 06:03

## 2017-03-28 RX ADMIN — ONDANSETRON 4 MG: 2 INJECTION INTRAMUSCULAR; INTRAVENOUS at 07:03

## 2017-03-28 RX ADMIN — KETOROLAC TROMETHAMINE 30 MG: 30 INJECTION, SOLUTION INTRAMUSCULAR; INTRAVENOUS at 08:03

## 2017-03-28 NOTE — PLAN OF CARE
Problem: Breastfeeding (Adult,Obstetrics,Pediatric)  Goal: Signs and Symptoms of Listed Potential Problems Will be Absent, Minimized or Managed (Breastfeeding)  Signs and symptoms of listed potential problems will be absent, minimized or managed by discharge/transition of care (reference Breastfeeding (Adult,Obstetrics,Pediatric) CPG).   Outcome: Ongoing (interventions implemented as appropriate)  Pumping for NICU infant.    03/28/17 1745   Breastfeeding   Problems Assessed (Breastfeeding) all   Problems Present (Breastfeeding) other (see comments)

## 2017-03-28 NOTE — PROGRESS NOTES
BG 59, Anesthesia resident recommended crackers, Pt reported feeling jittery, Dr Luna notified of pt status and ordered to give pt crackers/juice/peanutbutter.

## 2017-03-28 NOTE — PROGRESS NOTES
MD notified of blood glucose of 66 while awaiting lunch to be delivered. MD states to give lilibeth crackers and juice and have pt eat lunch. No further orders given. Will continue to monitor.

## 2017-03-28 NOTE — IP AVS SNAPSHOT
[facility] Location ([facility])  [address] [place], [place] [address]  Phone: [phone]           Patient Discharge Instructions   Our goal is to set you up for success. This packet includes information on your condition, medications, and your home care.  It will help you care for yourself to prevent having to return to the hospital.     Please ask your nurse if you have any questions.      There are many details to remember when preparing to leave the hospital. Here is what you will need to do:    1. Take your medicine. If you are prescribed medications, review your Medication List on the following pages. You may have new medications to  at the pharmacy and others that you'll need to stop taking. Review the instructions for how and when to take your medications. Talk with your doctor or nurses if you are unsure of what to do.     2. Go to your follow-up appointments. Specific follow-up information is listed in the following pages. Your may be contacted by a nurse or clinical provider about future appointments. Be sure we have all of the phone numbers to reach you. Please contact your provider's office if you are unable to make an appointment.     3. Watch for warning signs. Your doctor or nurse will give you detailed warning signs to watch for and when to call for assistance. These instructions may also include educational information about your condition. If you experience any of warning signs to your health, call your doctor.           [facility]  Unless otherwise directed by your provider, please   contact [facility], our nurse care line   that is available for 24/7 assistance.     [phone] (toll-free)     Registered nurses in the [facility]   provide: appointment scheduling, clinical advisement, health education, and other advisory services.                  ** Verify the list of medication(s) below is accurate and up to date. Carry this with you in case of  "emergency. If your medications have changed, please notify your healthcare provider.             Medication List      START taking these medications        Additional Info                      ibuprofen 800 MG tablet   Commonly known as:  ADVIL,MOTRIN   Quantity:  30 tablet   Refills:  0   Dose:  800 mg    Last time this was given:  800 mg on 4/2/2017  1:23 PM   Instructions:  Take 1 tablet (800 mg total) by mouth every 6 (six) hours as needed (cramping/pain).     Begin Date    AM    Noon    PM    Bedtime       oxycodone-acetaminophen 5-325 mg per tablet   Commonly known as:  PERCOCET   Quantity:  30 tablet   Refills:  0   Dose:  1 tablet    Instructions:  Take 1 tablet by mouth every 4 (four) hours as needed for Pain.     Begin Date    AM    Noon    PM    Bedtime         CONTINUE taking these medications        Additional Info                      ACCU-CHEK FASTCLIX Misc   Refills:  0   Generic drug:  lancets      Begin Date    AM    Noon    PM    Bedtime       * BD INSULIN SYRINGE HALF UNIT 0.3 mL 31 gauge x 5/16" Syrg   Refills:  0   Generic drug:  insulin syr/ndl U100 half renata      Begin Date    AM    Noon    PM    Bedtime       * BD INSULIN SYRINGE HALF UNIT 0.3 mL 31 gauge x 5/16" Syrg   Refills:  0   Generic drug:  insulin syr/ndl U100 half renata      Begin Date    AM    Noon    PM    Bedtime       NESTABS 32-1,000 mg-mcg Tab   Refills:  0   Generic drug:  prenatal vit#86-iron bisgly-FA      Begin Date    AM    Noon    PM    Bedtime       NOVOLIN N 100 unit/mL injection   Refills:  0   Generic drug:  insulin NPH    Last time this was given:  19 Units on 4/1/2017  9:17 PM     Begin Date    AM    Noon    PM    Bedtime       NOVOLIN R 100 unit/mL injection   Refills:  0   Generic drug:  insulin regular    Last time this was given:  20 Units on 3/30/2017  7:54 PM     Begin Date    AM    Noon    PM    Bedtime       ONETOUCH ULTRA TEST Strp   Refills:  0   Generic drug:  blood sugar diagnostic      Begin Date    AM "    Noon    PM    Bedtime       ONETOUCH ULTRAMINI kit   Refills:  0   Generic drug:  blood-glucose meter      Begin Date    AM    Noon    PM    Bedtime       * Notice:  This list has 2 medication(s) that are the same as other medications prescribed for you. Read the directions carefully, and ask your doctor or other care provider to review them with you.      STOP taking these medications     aspirin 81 MG Chew            Where to Get Your Medications      You can get these medications from any pharmacy     Bring a paper prescription for each of these medications     ibuprofen 800 MG tablet    oxycodone-acetaminophen 5-325 mg per tablet                  Please bring to all follow up appointments:    1. A copy of your discharge instructions.  2. All medicines you are currently taking in their original bottles.  3. Identification and insurance card.    Please arrive 15 minutes ahead of scheduled appointment time.    Please call 24 hours in advance if you must reschedule your appointment and/or time.        Follow-up Information     Follow up with Chanel Sawant MD In 1 week.    Specialty:  Obstetrics and Gynecology    Why:  BP check    Contact information:    [address] [address]  [phone]          Follow up with Chanel Sawant MD In 6 weeks.    Specialty:  Obstetrics and Gynecology    Why:  post partum    Contact information:    [address] [facility] [address]  [phone]          Discharge Instructions     Future Orders    Activity as tolerated     Call MD for:  difficulty breathing or increased cough     Call MD for:  increased confusion or weakness     Call MD for:  persistent dizziness, light-headedness, or visual disturbances     Call MD for:  persistent nausea and vomiting or diarrhea     Call MD for:  redness, tenderness, or signs of infection (pain, swelling, redness, odor or green/yellow discharge around incision site)     Call MD for:  severe persistent headache     Call MD for:  severe  uncontrolled pain     Call MD for:  temperature >100.4     Call MD for:  worsening rash     Diet general     Questions:    Total calories:      Fat restriction, if any:      Protein restriction, if any:      Na restriction, if any:      Fluid restriction:      Additional restrictions:          Discharge Instructions       Preparation and Hygiene:    1. Shower daily.  2. Wear a clean bra each day and wash daily in warm soapy water.  3. Change wet or moist breast pads frequently.  Moist pads can promote growth of germs.  4. Actively wash your hands, paying close attention to the area around and under your fingernails, thoroughly with soap and water for 15 seconds before pumping or handling your milk.  Re-wash your hands if you touch anything (scratching your nose, answering the phone, etc) while pumping or handling your milk.   5. Before pumping your breasts, assemble the pump collection kit and have ready the sterile container and labels.  Place these items on a clean surface next to the breastpump.  6. Each time after you have finished pumping, take apart all of the parts of the breastpump collection kit and place them in a separate cleaning container (do not place them in the sink).  Be sure to remove the yellow valve from the breastshield and separate the white membrane from the yellow valve.  Rinse all of these parts with cool water.  Then use a new sponge and/or bottle brush and dishwashing detergent to clean the parts.  Rinse off the soapy water with cool water and air dry on a clean towel covered with a clean cloth.  All parts may also be washed after each use in the top rack of a .  7. Once each day, sterilize all of the parts of the breastpump collection kit.  This can be done by boiling the kit parts for 10 minutes or by using a Quick Clean Micro-Steam Bag made by [employer]  8. If condensation appears in the tubing, continue to run the pump with the tubing attached for 1-2 minutes or until the  tubing is dry.   9. Notify your babys nurse or doctor if you become ill or need to take any medication, even over-the-counter medicines.        Collection and Storage of Expressed Breastmilk:         1. Pump your breasts at least 8-10 times every 24 hours.  Double pump (both breasts at  the same time) for at least 15-20 minutes using the most suction that is comfortable.    2. Write the date and time of pumping and the name of any medications you are takingon the babys pre-printed hospital identification label.   3. Place your babys pre-printed hospital identification label on each container of breastmilk.  Additional pre-printed labels can be obtained from your babys nurse.  If your expressed breastmilk is not correctly labeled, the nurse cannot feed the milk to the baby.       4. Place a brightly colored sticker on the top of each container of milk pumped during the first 30 days.  This identifies the milk as special and having higher levels of nutrients and anti-infective properties that are so important for your baby.  Additional stickers can be obtained from the lactation consultants or your babys nurse.  5.   Do not touch the inside of the storage containers or lids.  6.      Pour the amount of expressed milk needed for 1 of your babys feedings into each   storage container. Use a new container(s) for each pumping.  Additional storage   containers can be obtained from your babys nurse.        7.       Tightly screw the lid onto the container and place immediately into the       refrigerator fordaily transportation to the hospital.   Do not freeze your milk      unless asked to do so by your babys nurse.  However, if you are not able to      visit your baby each day, place the expressed breastmilk in the freezer.  8.   Expressed breastmilk should be refrigerated or frozen within 1 hour of      pumping.  9.      Do not store expressed breastmilk on the door of your refrigerator or freezer where the  temperature is warmer.         Transportation of Expressed Breastmilk:    1. Refrigerated breastmilk or frozen milk should be packed tightly together in a cooler with frozen, blue gel-packs to keep the milk frozen.  DO NOT USE ICE CUBES (WET ICE) TO TRANSPORT FROZEN MILK.   A clean towel can be used to fill any extra space between containers of frozen milk.  2.    Bring your expressed milk from home each time you visit the baby.                Breastfeeding Discharge Instructions       Feed the baby at the earliest sign of hunger or comfort  o Hands to mouth, sucking motions  o Rooting or searching for something to suck on  o Dont wait for crying - it is a sign of distress     The feedings may be 8-12 times per 24hrs and will not follow a schedule   Avoid pacifiers and bottles for the first 4 weeks   Alternate the breast you start the feeding with, or start with the breast that feels the fullest   Switch breasts when the baby takes himself off the breast or falls asleep   Keep offering breasts until the baby looks full, no longer gives hunger signs, and stays asleep when placed on his back in the crib   If the baby is sleepy and wont wake for a feeding, put the baby skin-to-skin dressed in a diaper against the mothers bare chest   Sleep near your baby   The baby should be positioned and latched on to the breast correctly  o Chest-to-chest, chin in the breast  o Babys lips are flipped outward  o Babys mouth is stretched open wide like a shout  o Babys sucking should feel like tugging to the mother  - The baby should be drinking at the breast:  o You should hear swallowing or gulping throughout the feeding  o You should see milk on the babys lips when he comes off the breast  o Your breasts should be softer when the baby is finished feeding  o The baby should look relaxed at the end of feedings  o After the 4th day and your milk is in:  o The babys poop should turn bright yellow and be loose,  watery, and seedy  o The baby should have at least 3-4 poops the size of the palm of your hand per day  o The baby should have at least 5-6 wet diapers per day  o The urine should be light yellow in color  You should drink when you are thirsty and eat a healthy diet when you are    hungry.     Take naps to get the rest you need.   Take medications and/or drink alcohol only with permission of your obstetrician    or the babys pediatrician.  You can also call the [facility] Risk [facility],   ([phone]), Monday-Friday, 8am-5pm Central time, to get the most   up-to-date evidence-based information on the use of medications during   pregnancy and breastfeeding.      The baby should be examined by a pediatrician at 3-5 days of age.  Once your   milk comes in, the baby should be gaining at least ½ - 1oz each day and should be back to birthweight no later than 10-14 days of age.          Community Resources    [facility] Breastfeeding Warmline: [phone]   Local [facility] clinics: provide incentives and breastpumps to eligible mothers  La Leche League International (LLLI):  mother-to-mother support group website        www.[employer] System[employer]Sens  Local La Leche League mother-to-mother support groups:        www.flikdate.[employer]        La Leche League [facility]   Dr. Vincent Boudreaux website for latch videos and general information:        www.breastfeedinginc.ca  [facility] Risk [facility] is a call center that provides information about the safety of taking medications while breastfeeding.  Call [phone], M-F, 8am-5pm, CT.  International Lactation Consultant Association provides resources for assistance:        www.ilca.org  [place] Breastfeeding Coalition provides informationand resources for parents  and the community    http://lou[place]ianabreastfeeding.org     Alycia Kelly is a mom-to-mom support group:                             www.nolanTomveyi Bidamon.[employer]elabraGames//breastfeedng-support/  Partners for Healthy Babies:  6-089-668-BABY(1966)  [employer]: a breastfeeding support group for women of color, 519.146.9174        Primary Diagnosis     Your primary diagnosis was:  Status Post Repeat Low Transverse  Section      Admission Information     Date & Time Provider Department CSN    3/28/2017  4:59 AM Shilo Nunes III, MD [facility] [id number]      Care Providers     Provider Role Specialty Primary office phone    Shilo Nunes III, MD Attending Provider Maternal and Fetal Medicine [phone]    Shilo Nunes III, MD Surgeon  Maternal and Fetal Medicine [phone]    Nathalie Ortiz MD Consulting Physician  Obstetrics and Gynecology [phone]      Your Vitals Were     BP Pulse Temp Resp Height Weight    129/65 82 98.5 °F (36.9 °C) (Oral) 18 5' 10" (1.778 m) 122 kg (269 lb)    SpO2 BMI             99% 38.6 kg/m2         Recent Lab Values     No lab values to display.      Pending Labs     Order Current Status    Specimen to Pathology - Surgery In process      Allergies as of 2017     No Known Allergies      Advance Directives     An advance directive is a document which, in the event you are no longer able to make decisions for yourself, tells your healthcare team what kind of treatment you do or do not want to receive, or who you would like to make those decisions for you.  If you do not currently have an advance directive, [facility] encourages you to create one.  For more information call:  [phone] ([phone]), [phone] ([phone]),  or log on to [email or web address].        Language Assistance Services     ATTENTION: Language assistance services are available, free of charge. Please call [phone].      ATENCIÓN: Si habla español, tiene a bourgeois disposición servicios gratuitos de asistencia lingüística. Llame al [phone].     CHÚ Ý: N?u b?n nói Ti?ng Vi?t, có các d?ch v? h? tr? ngôn ng? mi?n phí dành cho b?n. G?i s? [phone].        Diabetes Discharge Instructions     "                               [facility] Sign-Up     Activating your [facility] account is as easy as 1-2-3!     1) Visit [email or web address], select Sign Up Now, enter this activation code and your date of birth, then select Next.  [email or web address]  Expires: 5/17/2017  1:35 PM      2) Create a username and password to use when you visit [facility] in the future and select a security question in case you lose your password and select Next.    3) Enter your e-mail address and click Sign Up!    Additional Information  If you have questions, please e-mail [email or web address] or call [phone] to talk to our [facility] staff. Remember, [facility] is NOT to be used for urgent needs. For medical emergencies, dial 911.          [facility] complies with applicable Federal civil rights laws and does not discriminate on the basis of race, color, national origin, age, disability, or sex.

## 2017-03-28 NOTE — PROGRESS NOTES
I met Mrs. Russell and her  in L&D; she is here for a planned c/s. We reviewed the study in detail, including purpose, numbers/length, procedures, risk/benefit, cost/payment, alternatives/voluntary nature/withdrawal, confidentiality/HIPAA, contacts. She had a wound vac after a previous c/s when the incision opened up. She had no questions and willingly signed the consent, denying participation in any other studies and agreeing to future use of data. She was given a copy and one was placed in the medical record.  She was randomized to routine dressing.

## 2017-03-28 NOTE — ANESTHESIA PREPROCEDURE EVALUATION
2017  Stephanie Russell is a 42 y.o. female presents for repeat  secondary to two prior  deliveries and fetal trisomy 21 w/ AV canal defects. She has a hx of poorly controlled DMII managed with insulin. \    Prior general and neuraxial anesthesia. Describes difficult neuraxial placement, unknown cause.      OB History    Para Term  AB SAB TAB Ectopic Multiple Living   6 3 2 1 2 1  1  1      # Outcome Date GA Lbr Nikhil/2nd Weight Sex Delivery Anes PTL Lv   6 Current            5 SAB    0.4 kg (14.1 oz) M   Y FD   4     0.5 kg (1 lb 1.6 oz) M   Y FD      Complications: Nuchal cord   3 Term    3.175 kg (7 lb) F CS-LTranv      2 Ectopic            1 Term    4.649 kg (10 lb 4 oz) M   N Y      Complications: Failure to progress in labor          Wt Readings from Last 1 Encounters:   17 0513 122 kg (269 lb)       BP Readings from Last 3 Encounters:   17 128/77   17 115/66   17 114/68       Patient Active Problem List   Diagnosis    Trisomy 21 Fetus with AV Canal Defect    Elderly multigravida in third trimester    Fetal trisomy 21 affecting care of mother, antepartum    Pre-existing type 2 diabetes mellitus during pregnancy in third trimester    Trisomy 21 of fetus in current russell pregnancy    S/p RLTCS on 3/28       Past Surgical History:   Procedure Laterality Date     SECTION      ECTOPIC PREGNANCY SURGERY         Social History     Social History    Marital status:      Spouse name: N/A    Number of children: N/A    Years of education: N/A     Occupational History    Not on file.     Social History Main Topics    Smoking status: Never Smoker    Smokeless tobacco: Not on file    Alcohol use No    Drug use: No    Sexual activity: Yes     Other Topics Concern    Not on file     Social  History Narrative         Chemistry    No results found for: NA, K, CL, CO2, BUN, CREATININE, GLU No results found for: CALCIUM, ALKPHOS, AST, ALT, BILITOT         Lab Results   Component Value Date    WBC 7.02 03/28/2017    HGB 13.2 03/28/2017    HCT 38.8 03/28/2017    MCV 82 03/28/2017     03/28/2017       [facility] Anesthesia Evaluation    I have reviewed the Patient Summary Reports.     I have reviewed the Medications.     Review of Systems  Anesthesia Hx:  No problems with previous Anesthesia  History of prior surgery of interest to airway management or planning: Previous anesthesia: General, Epidural Denies Family Hx of Anesthesia complications.   Denies Personal Hx of Anesthesia complications.   Hematology/Oncology:  Hematology Normal   Oncology Normal     EENT/Dental:EENT/Dental Normal   Cardiovascular:  Cardiovascular Normal     Pulmonary:  Pulmonary Normal    Renal/:  Renal/ Normal     Hepatic/GI:  Hepatic/GI Normal    Musculoskeletal:  Musculoskeletal Normal    Neurological:  Neurology Normal    Endocrine:   Diabetes, poorly controlled, type 2, using insulin    Psych:  Psychiatric Normal           Physical Exam  General:  Well nourished, Obesity    Airway/Jaw/Neck:  Airway Findings: Mouth Opening: Normal Tongue: Normal  General Airway Assessment: Adult  Mallampati: IV  Improves to III with phonation.  TM Distance: Normal, at least 6 cm      Dental:  Dental Findings: In tact   Chest/Lungs:  Chest/Lungs Findings: Normal Respiratory Rate     Heart/Vascular:  Heart Findings: Rate: Normal  Rhythm: Regular Rhythm  Vascular Findings:  Vascular Access: Peripheral IV(s)        Mental Status:  Mental Status Findings:  Cooperative, Alert and Oriented         Anesthesia Plan  Type of Anesthesia, risks & benefits discussed:  Anesthesia Type:  CSE, epidural, general, spinal  Patient's Preference:   Intra-op Monitoring Plan: standard ASA monitors  Intra-op Monitoring Plan Comments:   Post Op Pain Control Plan:    Post Op Pain Control Plan Comments:   Induction:   IV  Beta Blocker:  Patient is not currently on a Beta-Blocker (No further documentation required).       Informed Consent: Patient understands risks and agrees with Anesthesia plan.  Questions answered. Anesthesia consent signed with patient.  ASA Score: 3     Day of Surgery Review of History & Physical:    H&P update referred to the surgeon.     Anesthesia Plan Notes: NPO checked, plan for CSE.         Ready For Surgery From Anesthesia Perspective.

## 2017-03-28 NOTE — PROGRESS NOTES
Mother would like to pump for her baby. Mother encouraged to provide  breastmilk by pumping.Benefits of breastmilk discussed. Breastpump initiated. Mother educated on pump use, cleaning pump parts, labeling containers and storage of breastmilk. Mother to call her nurse with further questions.

## 2017-03-28 NOTE — L&D DELIVERY NOTE
Section Operative Note  Procedure Date: 2017     Procedure: Repeat classical  Section via pfannensteil skin incision    Indications: history of c/s x2    Pre-operative Diagnosis: IUP at 38 week 0 day pregnancy    Post-operative Diagnosis: same    Surgeon: Dr. Nunes     Assistants: Preston Isaacs MD (res)    Anesthesia: Spinal anesthesia    Findings:   1. Large gravid uterus  2. Male infant in transverse orientation  3.  Normal tubes and ovaries  4. Dense fascia    Estimated Blood Loss:  1300mL           Total IV Fluids: 2000 mL     UOP: 200 mL    Specimens: single viable male infant , Placenta which was sent to pathology    PreOp CBC:   Lab Results   Component Value Date    WBC 7.02 2017    HGB 13.2 2017    HCT 38.8 2017    MCV 82 2017     2017                     Complications:  None; patient tolerated the procedure well.           Disposition: PACU - hemodynamically stable.           Condition: stable    Procedure Details   The patient was seen in the Holding Room. The risks, benefits, complications, treatment options, and expected outcomes were discussed with the patient.  The patient concurred with the proposed plan, giving informed consent.  The site of surgery properly noted/marked. The patient was taken to Operating Room, identified as Stephanie Russell and the procedure verified as  Delivery. A Time Out was held and the above information confirmed.    After induction of anesthesia, the patient was prepped and draped in the usual sterile manner while placed in a dorsal supine position with a left lateral tilt.  A montes catheter was also placed per nursing. Preoperative antibiotics were administered and an allis test was performed yielding adequate anesthesia.  A Pfannenstiel incision was made and carried down through the subcutaneous tissue to the fascia. Fascial incision was made and extended transversely. The fascia was grasped  with Kocher clamps and  from the underlying rectus tissue superiorly and inferiorly. The peritoneum was identified, found to be free of adherent bowl and entered bluntly. Peritoneal incision was extended longitudinally. The vesico-uterine peritoneum was identified and bladder blade was inserted. The vesico-uterine peritoneum was incised transversely and the bladder flap was bluntly freed from the lower uterine segment. The bladder blade was reinserted to keep the bladder out of the operative field. A classical uterine incision was made with knife and extended bluntly. The amniotic sac was ruptured on entry and the infant was noted to be in transverse position.  The feet were grabbed and used to rotate the infant to a cephalic position. The head was brought to the incision and elevated out of the pelvis. The patient delivered a single viable male infant with mild difficulty.  Infant weighed 3714 grams with Apgar scores of 4/9 at one and five minutes respectively. After the umbilical cord was clamped and cut cord blood was obtained for evaluation. The placenta was removed intact and appeared normal and was sent to pathology. The uterus was exteriorized. The uterine outline, tubes and ovaries appeared normal. The uterine incision was closed with running locked sutures of #1 chromic followed by two embrocating layers of #1 chromic. Sudheer was placed above the bladder flap.  Hemostasis was observed. The uterus was returned to the abdominal cavity. Incision was reinspected and good hemostasis was noted. The abdominal cavity was irrigated to remove clots. The peritoneum and was reapproximated with 2-0 vicryl. The fascia was then reapproximated with running sutures of #1 PDS. The skin was reapproximated with 4-0 monocryl.    Instrument, sponge, and needle counts were correct prior the abdominal closure and at the conclusion of the case.     Pt tolerated procedure well and Dr. Nunes discussed with family that pt  was stable and in good condition after the procedure.    Delivery Information for  Pedro Brown Russell    Birth information:  YOB: 2016   Time of birth: 7:21 AM   Sex: male   Head Delivery Date/Time: 3/28/2017  7:21 AM   Delivery type: , Classical   Gestational Age: 38w0d    Delivery Providers    Delivering clinician:  MODESTO PANIAGUA III   Other personnel:   Provider Role   DEANDRE SPARROW ALISON Registered Nurse   RAUL DAO Surgical Tech                  Eloy Measurements    Weight:  3714 g Length:  51.4 cm   Head circum.:  34.9 cm Chest circum.:  32.4 cm          Eloy Assessment    Living status:  Yes   Apgars    1 Minute:   5 Minute:   10 Minute 15 Minute 20 Minute   Skin Color: 0  1       Heart Rate: 2  2       Reflex Irritability: 0  2       Muscle Tone: 1  2       Respiratory Effort: 1  2       Total: 4  9                  Apgars Assigned By:  NICU ATTENDED          Assisted Delivery Details:    Forceps attempted?:  No   Vacuum extractor attempted?:  No             Shoulder Dystocia    Shoulder dystocia present?:  No                                             Presentation and Position    Presentation: Transverse   Position:                    Interventions/Resuscitation    Method:  NICU Attended        Cord    Vessels:  3 vessels   Complications:  None   Delayed Cord Clamping?:  No   Cord Clamped Date/Time:  3/28/2017  7:22 AM   Cord Blood Disposition:  Sent with Baby   Gases Sent?:  Yes   Stem Cell Collection (by MD):  No        Placenta    Date and time:  3/28/2017  7:24 AM   Removal:  Manual removal   Appearance:  Intact   Placenta disposition:  Pathology            Labor Events:       labor: No     Labor Onset Date/Time:         Dilation Complete Date/Time:         Start Pushing Date/Time:       Rupture Date/Time:              Rupture type:           Fluid Amount:        Fluid Color:        Fluid Odor:        Membrane Status  (PeriCalm):        Rupture Date/Time (PeriCalm):        Fluid Amount (PeriCalm):        Fluid Color (PeriCalm):         steroids: None     Antibiotics given for GBS: No     Induction: none     Indications for induction:        Augmentation:       Indications for augmentation:       Labor complications: None     Additional complications:          Cervical ripening:                     Delivery:      Episiotomy: None     Indication for Episiotomy:       Perineal Lacerations: None Repaired:      Periurethral Laceration: none Repaired:     Labial Laceration: none Repaired:     Sulcus Laceration: none Repaired:     Vaginal Laceration: No Repaired:     Cervical Laceration: No Repaired:     Repair suture:       Repair # of packets:       Blood loss (ml): 1300     Vaginal Sweep Performed: No     Surgicount Correct: Yes       Other providers:       Anesthesia    Method:  Spinal              Details (if applicable):  Trial of Labor No    Categorization: Repeat    Priority: Routine   Indications for : Known/Suspected Fetal Anomaly   Incision Type: Low Vertical     Additional  information:  Forceps:    Vacuum:    Breech:    Observed anomalies    Other (Comments):

## 2017-03-28 NOTE — OP NOTE
DATE OF PROCEDURE:  2017    REFERRING PHYSICIAN:  Chanel Sawant M.D.    PREOPERATIVE DIAGNOSES:  1.  Intrauterine pregnancy at 38 weeks.  2.  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  3.  Fetal trisomy 21.  4.  Two second trimester fetal demises.  5.  Two prior  sections.  6.  Prior wound dehiscence.  7.  Type 2 diabetes.  8.  Advanced maternal age.  9.  BMI 38.    POSTOPERATIVE DIAGNOSES:  1.  Intrauterine pregnancy at 38 weeks.  2.  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  3.  Fetal trisomy 21.  4.  Two second trimester fetal demises.  5.  Two prior  sections.  6.  Prior wound dehiscence.  7.  Type 2 diabetes.  8.  Advanced maternal age.  9.  BMI 38.  10.  Transverse lie.    OPERATIVE PROCEDURE:  Repeat  section with classical incision.    FINDINGS:  The baby was a 8 lbs 3oz. male infant.  The placenta was intact.    ANESTHESIA:  Combined spinal epidural.    REFERRAL PHYSICIAN:  Shilo Nunes III, M.D.    ASSISTANT:  Preston Isaacs M.D. (RES).    PROCEDURE IN DETAIL:  This is a 42-year-old  6, para 2-2-1-2 with an TUAN   of 2017.  The patient had been followed by Maternal-Fetal Medicine because   a free fetal DNA screen revealed that the fetus had trisomy 21.  Our fetal   cardiac team had performed an ultrasound, which revealed balanced AV canal   defects and the primary obstetrician requested delivery at [facility].    The patient was taken to the Operating Room where she received a combined spinal   epidural.  She was then changed to the supine position.  A Taylor catheter   was inserted and the abdomen was prepped and draped.  The patient has had 2   prior Pfannenstiel incisions.  Her last incision ended in a wound dehiscence and   so she had significant distortion of the wound site, but because of the pannus, we   were unable to resect or excise the prior scar.  So for that reason, we started   our Pfannenstiel incision through  the old scar and carried down in layers to the   peritoneum.  Essentially the subcutaneous tissues, fascia, muscle and   peritoneum were almost 1 solid layer.  We tried to create planes, but it was   very difficult.    When we entered the abdomen, there was bowel adhesed to the anterior abdominal   wall.  The lower uterine segment was narrow with scarred   densely adhesed bladder flap with varicosities.    The fetal head was in the transverse position on the right and therefore, a   classical incision was made on the uterus.  The fetal head was swept into the   incision and the fetus was delivered from the cephalic presentation.  The cord   was clamped and cut and the baby was handed to our  Intensive Care Team.    The placenta was then wiped out.  It was abundant, but wiped out nicely.    The uterus was then closed in 4 layers with a #1 chromic.  The tubes and ovaries   were inspected and found to be normal.  The uterus was placed back in the   abdomen.  The gutters were washed and suctioned.  There was some bleeding in the   area of the bladder flap.  This was packed off and then treated with Sudheer.    The uterine incision appeared dry.  The patient had refused tubal ligation.   The peritoneum and muscle were closed together in 1 layer with a 2-0   Vicryl.  The fascia was reapproximated with a #1 PDS.    I called Dr. Frederick Etienne, III in to evaluate a possible panniculectomy in an   effort to see if the incision would close in a better fashion.  He felt it was   better to create planes with the Bovie, dissecting the skin away from the   subcutaneous tissues to free up enough to get a decent closure.    The subcutaneous tissues were thoroughly irrigated and then coagulated   hemostatic and then the skin was closed by Dr. Isaacs with a 4-0 Monocryl in a   subcuticular fashion.  The patient tolerated the procedure well and went to   Recovery in stable condition.      JU/  dd: 2017 09:54:46 (CDT)   td: 03/28/2017 11:57:44 (CDT)  Doc ID   #[id number]  Job ID #[id number]    CC:

## 2017-03-28 NOTE — LACTATION NOTE
03/28/17 1630   Maternal Infant Assessment   Breast Shape (wide space between)   Breast Density Bilateral:;soft   Areola Bilateral:;elastic   Nipple(s) Bilateral:;everted   Breasts WDL   Breasts WDL WDL       Number Scale   Presence of Pain denies   Location nipple(s)   Pain Rating: Rest 0   Pain Rating: Activity 0   Maternal Infant Feeding   Maternal Emotional State assist needed;relaxed   Time Spent (min) 15-30 min   Breastfeeding Education adequate milk volume;increasing milk supply;label/storage of breast milk;milk expression, electric pump;milk expression, hand   Equipment Type/Education   Pump Type Symphony   Breast Pump Type double electric, hospital grade   Breast Pump Flange Type hard   Breast Pump Flange Size 27 mm   Breast Pumping Bilateral Breasts:   Pumping Frequency (times) (8-10 per 24 hours)   Lactation Referrals   Lactation Consult Knowledge deficit;Pump teaching   Lactation Interventions   Breastfeeding Assistance electric breast pump used;milk expression/pumping   LC rounds. Pt currently with breast pump in use as LC into room. Pump use and care reviewed with pt. Flange fit assessed. Flange size increased to 27 mm for better fit. NICU folder given. Education reviewed. Assisted with hand expression. education provided on hand expression.

## 2017-03-28 NOTE — PROGRESS NOTES
Notified Dr Isaacs that pt was not feeling jittery, MD did not want CBG at this time. Will continue to monitor as ordered.

## 2017-03-28 NOTE — ANESTHESIA PROCEDURE NOTES
CSE    Patient location during procedure: OR  Start time: 3/28/2017 6:50 AM  Timeout: 3/28/2017 6:45 AM  End time: 3/28/2017 7:04 AM  Staffing  Anesthesiologist: NITHIN VALLECILLO  Resident/CRNA: WILLI HUFFMAN  Performed by: resident/CRNA   Preanesthetic Checklist  Completed: patient identified, site marked, surgical consent, pre-op evaluation, timeout performed, IV checked, risks and benefits discussed and monitors and equipment checked  CSE  Patient position: sitting  Prep: ChloraPrep  Patient monitoring: heart rate, continuous pulse ox and frequent blood pressure checks  Approach: midline  Spinal Needle  Needle type: Tuohy   Needle gauge: 25 G  Needle length: 5 in  Epidural Needle  Injection technique: ANA M air  Needle type: Tuohy   Needle gauge: 17 G  Needle length: 3.5 in  Needle insertion depth: 9.5 cm  Location: L3-4  Needle localization: anatomical landmarks  Catheter  Catheter type: [employer]  Catheter size: 19 G  Catheter at skin depth: 14 cm  Assessment  Sensory level: T5   Dermatomal levels determined by pinch or prick  Intrathecal Medications: with dextrose and 0.75 bupivacaine  administered: primary anesthetic and 160 mcg of  fentanyl and morphine

## 2017-03-28 NOTE — PROGRESS NOTES
Pt ate crackers, spoonful of peanut butter, and orange juice. Stated she was no longer feeling jittery.

## 2017-03-28 NOTE — PROGRESS NOTES
Pt did not call RN prior to eating dinner. POCT blood glucose checked post dinner-102. Pt states she will not take insulin with her dinner. Pt educated about glucose check and scheduled insulin prior to bedtime. Pt verbalizes she will take see how her sugar is and the decide if she will take the insulin. MD notified. MD states it is ok if pt refuses insulin with dinner but pt must take scheduled NPH at bedtime. Will pass on to night RN.

## 2017-03-28 NOTE — TRANSFER OF CARE
"Anesthesia Transfer of Care Note    Patient: Stephanie Marley Russell    Procedure(s) Performed: Procedure(s) (LRB):  DELIVERY- SECTION (Bilateral)    Patient location: PACU    Anesthesia Type: CSE    Transport from OR: Transported from OR on room air with adequate spontaneous ventilation    Post pain: adequate analgesia    Post assessment: no apparent anesthetic complications and tolerated procedure well    Post vital signs: stable    Level of consciousness: awake, alert and oriented    Nausea/Vomiting: no nausea/vomiting    Complications: none          Last vitals:   Visit Vitals    /77    Pulse 77    Temp 35.6 °C (96.1 °F) (Temporal)    Resp 18    Ht 5' 10" (1.778 m)    Wt 122 kg (269 lb)    SpO2 98%    Breastfeeding No    BMI 38.6 kg/m2     "

## 2017-03-28 NOTE — H&P
"   HISTORY AND PHYSICAL                                                OBSTETRICS          Subjective:       Stephanie Russell is a 42 y.o.  female with IUP at 38w0d weeks gestation who presents for scheduled RLTCS.     This IUP is complicated by DM2 (poor control), h/o IUFDx2, h/o CSx2, Fetal trisomy 21 w/ AV canal defect, AMA. Patient denies contractions, denies vaginal bleeding, denies LOF.   Fetal Movement: normal.     PMHx: DM2     PSHx:   Past Surgical History:   Procedure Laterality Date     SECTION      ECTOPIC PREGNANCY SURGERY         All: Review of patient's allergies indicates:  No Known Allergies    Meds:   Prescriptions Prior to Admission   Medication Sig Dispense Refill Last Dose    ACCU-CHEK FASTCLIX Misc    Taking    aspirin 81 MG Chew Take 81 mg by mouth once daily.   Taking    BD INSULIN SYRINGE HALF UNIT 0.3 mL 31 gauge x 5/16" Syrg    Taking    BD INSULIN SYRINGE HALF UNIT 0.3 mL 31 gauge x 5/16" Syrg    Taking    NESTABS 32-1,000 mg-mcg Tab    Taking    NOVOLIN N 100 unit/mL injection    Taking    NOVOLIN R 100 unit/mL injection    Taking    ONETOUCH ULTRA TEST Strp    Taking    ONETOUCH ULTRAMINI kit    Taking       SH:   Social History     Social History    Marital status:      Spouse name: N/A    Number of children: N/A    Years of education: N/A     Occupational History    Not on file.     Social History Main Topics    Smoking status: Never Smoker    Smokeless tobacco: Not on file    Alcohol use No    Drug use: No    Sexual activity: Yes     Other Topics Concern    Not on file     Social History Narrative       FH:   Family History   Problem Relation Age of Onset    Hypertension Maternal Grandfather     Pacemaker/defibrilator Maternal Grandfather      66    Heart attacks under age 50 Neg Hx     Arrhythmia Neg Hx     Congenital heart disease Neg Hx        OBHx:   Obstetric History       T2      TAB0   SAB1   E1   M0   L1     "   # Outcome Date GA Lbr Nikhil/2nd Weight Sex Delivery Anes PTL Lv   6 Current            5 SAB    0.4 kg (14.1 oz) M   Y FD      Name: Benjamin Soria     0.5 kg (1 lb 1.6 oz) M   Y FD      Name: Lloyd      Complications: Nuchal cord   3 Term    3.175 kg (7 lb) F CS-LTranv         Name: Annalee   2 Ectopic            1 Term    4.649 kg (10 lb 4 oz) M   N Y      Name: Fabien      Complications: Failure to progress in labor          Objective:       Breastfeeding? No    There were no vitals filed for this visit.    General:   alert, appears stated age and cooperative   Lungs:   clear to auscultation bilaterally   Heart:   regular rate and rhythm, S1, S2 normal, no murmur, click, rub or gallop   Abdomen:  soft, non-tender; bowel sounds normal; no masses,  no organomegaly   Extremities negative edema, negative erythema   FHT:  Cat 2 (reassuring)                 TOCO: none   Presentations: Head RLQ, shoulder presenting    Cervix: def     Lab Review -- reviewed outside records see media tab   Blood Type POS per review outside records  GBBS: negative  Rubella: Immune  RPR: nr 1t T   HIV: negative 1st T  HepB: negative       Assessment:       38w0d weeks gestation scheduled RLTCS at term.     Active Hospital Problems    Diagnosis  POA    Trisomy 21 of fetus in current cordon pregnancy [O35.1XX0, Q90.9]  Not Applicable      Resolved Hospital Problems    Diagnosis Date Resolved POA   No resolved problems to display.          Plan:      Risks, benefits, alternatives and possible complications have been discussed in detail with the patient.     Scheduled RLTCS   - Consents signed and to chart  - Admit to Labor and Delivery unit  - Ancef/Azithro OCTOR    - Epidural per Anesthesia  - Draw CBC, T&S  - Notify Staff  - Post-Partum Hemorrhage risk - medium    Fetal Trisomy 21 w/ AV canal defect   - Notify NICU     DM2  - check BG now  - Home insulin:  Novalin N 48u in am and 38u pm   Novalin R 30u in am  and 10-14u at meals (2-3 times per day) and 20R at HS  - Restart at 1/2 post delivery   - Accuchecks JEFFREY Villela MD PGY-3   Ob-Gyn Resident

## 2017-03-28 NOTE — PROGRESS NOTES
Pt reports vomiting while eating lunch. Pt complains of still feeling intermittently nauseated. Pt asymptomatic otherwise. POCT glucose 64. Pt states she would like more crackers and juice. MD notified. MD states to give milk or juice. No further orders given. Will continue to monitor.

## 2017-03-29 ENCOUNTER — RESEARCH ENCOUNTER (OUTPATIENT)
Dept: RESEARCH | Facility: HOSPITAL | Age: 42
End: 2017-03-29

## 2017-03-29 LAB
BASOPHILS # BLD AUTO: 0.02 K/UL
BASOPHILS NFR BLD: 0.2 %
DIFFERENTIAL METHOD: ABNORMAL
EOSINOPHIL # BLD AUTO: 0.1 K/UL
EOSINOPHIL NFR BLD: 0.9 %
ERYTHROCYTE [DISTWIDTH] IN BLOOD BY AUTOMATED COUNT: 15.5 %
HCT VFR BLD AUTO: 32.8 %
HGB BLD-MCNC: 11.1 G/DL
LYMPHOCYTES # BLD AUTO: 1.8 K/UL
LYMPHOCYTES NFR BLD: 14.2 %
MCH RBC QN AUTO: 27.6 PG
MCHC RBC AUTO-ENTMCNC: 33.8 %
MCV RBC AUTO: 82 FL
MONOCYTES # BLD AUTO: 0.8 K/UL
MONOCYTES NFR BLD: 6.5 %
NEUTROPHILS # BLD AUTO: 9.7 K/UL
NEUTROPHILS NFR BLD: 78 %
PLATELET # BLD AUTO: 209 K/UL
PMV BLD AUTO: 9.9 FL
POCT GLUCOSE: 127 MG/DL (ref 70–110)
POCT GLUCOSE: 135 MG/DL (ref 70–110)
POCT GLUCOSE: 183 MG/DL (ref 70–110)
POCT GLUCOSE: 84 MG/DL (ref 70–110)
POCT GLUCOSE: 90 MG/DL (ref 70–110)
RBC # BLD AUTO: 4.02 M/UL
RPR SER QL: NORMAL
RUBV IGG SER-ACNC: 26 IU/ML
RUBV IGG SER-IMP: REACTIVE
WBC # BLD AUTO: 12.42 K/UL

## 2017-03-29 PROCEDURE — 11000001 HC ACUTE MED/SURG PRIVATE ROOM

## 2017-03-29 PROCEDURE — 85025 COMPLETE CBC W/AUTO DIFF WBC: CPT

## 2017-03-29 PROCEDURE — 63600175 PHARM REV CODE 636 W HCPCS: Performed by: OBSTETRICS & GYNECOLOGY

## 2017-03-29 PROCEDURE — 25000003 PHARM REV CODE 250: Performed by: ANESTHESIOLOGY

## 2017-03-29 PROCEDURE — 63600175 PHARM REV CODE 636 W HCPCS: Performed by: ANESTHESIOLOGY

## 2017-03-29 PROCEDURE — 36415 COLL VENOUS BLD VENIPUNCTURE: CPT

## 2017-03-29 PROCEDURE — 25000003 PHARM REV CODE 250: Performed by: STUDENT IN AN ORGANIZED HEALTH CARE EDUCATION/TRAINING PROGRAM

## 2017-03-29 PROCEDURE — 25000003 PHARM REV CODE 250: Performed by: OBSTETRICS & GYNECOLOGY

## 2017-03-29 RX ORDER — OXYCODONE AND ACETAMINOPHEN 5; 325 MG/1; MG/1
1 TABLET ORAL EVERY 4 HOURS PRN
Qty: 30 TABLET | Refills: 0 | Status: SHIPPED | OUTPATIENT
Start: 2017-03-29 | End: 2018-08-21

## 2017-03-29 RX ORDER — IBUPROFEN 800 MG/1
800 TABLET ORAL EVERY 6 HOURS PRN
Qty: 30 TABLET | Refills: 0 | Status: SHIPPED | OUTPATIENT
Start: 2017-03-29 | End: 2018-08-21

## 2017-03-29 RX ADMIN — OXYCODONE HYDROCHLORIDE AND ACETAMINOPHEN 1 TABLET: 10; 325 TABLET ORAL at 01:03

## 2017-03-29 RX ADMIN — OXYCODONE HYDROCHLORIDE 10 MG: 5 TABLET ORAL at 06:03

## 2017-03-29 RX ADMIN — IBUPROFEN 800 MG: 400 TABLET, FILM COATED ORAL at 01:03

## 2017-03-29 RX ADMIN — MISOPROSTOL 200 MCG: 200 TABLET ORAL at 04:03

## 2017-03-29 RX ADMIN — OXYCODONE HYDROCHLORIDE AND ACETAMINOPHEN 1 TABLET: 10; 325 TABLET ORAL at 06:03

## 2017-03-29 RX ADMIN — IBUPROFEN 800 MG: 400 TABLET, FILM COATED ORAL at 08:03

## 2017-03-29 RX ADMIN — OXYCODONE HYDROCHLORIDE 10 MG: 5 TABLET ORAL at 01:03

## 2017-03-29 RX ADMIN — IBUPROFEN 800 MG: 400 TABLET, FILM COATED ORAL at 09:03

## 2017-03-29 RX ADMIN — KETOROLAC TROMETHAMINE 30 MG: 30 INJECTION, SOLUTION INTRAMUSCULAR at 03:03

## 2017-03-29 RX ADMIN — ACETAMINOPHEN 650 MG: 325 TABLET ORAL at 03:03

## 2017-03-29 RX ADMIN — HUMAN INSULIN 19 UNITS: 100 INJECTION, SUSPENSION SUBCUTANEOUS at 09:03

## 2017-03-29 NOTE — ANESTHESIA POSTPROCEDURE EVALUATION
"Anesthesia Post Evaluation    Patient: Stephanie Marley Russell    Procedure(s) Performed: Procedure(s) (LRB):  DELIVERY- SECTION (Bilateral)    Final Anesthesia Type: CSE  Patient location during evaluation: floor  Patient participation: Yes- Able to Participate  Level of consciousness: awake and alert and oriented  Post-procedure vital signs: reviewed and stable  Pain management: adequate  Airway patency: patent  PONV status at discharge: vomiting (controlled) and nausea (controlled)  Anesthetic complications: no      Cardiovascular status: blood pressure returned to baseline and hemodynamically stable  Respiratory status: unassisted, room air and spontaneous ventilation  Hydration status: euvolemic  Follow-up not needed.        Visit Vitals    /80    Pulse 101    Temp 37.4 °C (99.4 °F) (Oral)    Resp 18    Ht 5' 10" (1.778 m)    Wt 122 kg (269 lb)    SpO2 97%    Breastfeeding Yes    BMI 38.6 kg/m2       Pain/Vincent Score: Pain Rating Prior to Med Admin: 8 (3/29/2017  1:18 PM)  Pain Rating Post Med Admin: 4 (3/29/2017  2:00 PM)      "

## 2017-03-29 NOTE — LACTATION NOTE
This note was copied from a baby's chart.     17 1130   Maternal Information   Infant Reason for Referral (NICU admission)   Maternal Medical Surgical History   History of Preexisting Medical Disorder yes   Medical Disorder diabetes type 2;other (see comments)   Surgical History yes   Surgical Procedure (S/P ectopic pregnancy)   Infertility History other (see comments)  (h/o intrauterine demise X 2; h/o ectopic pregnancy)   Infant Information   Infant's Name Edel   Maternal Infant Assessment   Breast Shape Bilateral:;pendulous;wide   Breast Density Bilateral:;soft   Areola Bilateral:;elastic   Nipple(s) Bilateral:;everted;graspable   Infant Assessment   Medical Condition other (see comments)  (Trisomy 21; IDM; A-V Canal)   Mouth Size small  (small, shallow palate)   Sucking Reflex present   Rooting Reflex present   Swallow Reflex present   LATCH Score   Latch 1-->repeated attempts, holds nipple in mouth, stimulate to suck   Audible Swallowing 0-->none   Type Of Nipple 2-->everted (after stimulation)   Comfort (Breast/Nipple) 2-->soft/nontender   Hold (Positioning) 1-->minimal assist, teach one side: mother does other, staff holds   Score (less than 7 for 2/more consecutive times, consult Lactation Consultant) 6   Maternal Infant Feeding   Maternal Emotional State anxious;assist needed   Infant Positioning cross-cradle;cradle   Time Spent (min) 15-30 min   Breastfeeding Education importance of skin-to-skin contact   Infant First Feeding   Breastfeeding breastfeeding, bilateral   Breastfeeding Left Side (min) 0 Min  (Attetmpted X 10 minutes)   Breastfeeding Right Side (min) 0 Min  (Attempted X 10 minutes)   Feeding Infant   Feeding Readiness Cues eager;hand to mouth movements   Effective Latch During Feeding no   Supplementation   Nipple Used For Feeding slow flow   Method of Supplementation bottle   Lactation Referrals   Lactation Consult Initial assessment;Breastfeeding assessment    Breastfeeding    Breast Pumping Interventions frequent pumping encouraged   Lactation Interventions   Attachment Promotion breastfeeding assistance provided;face-to-face positioning promoted;infant-mother separation minimized;privacy provided;skin-to-skin contact encouraged   Breastfeeding Assistance assisted with positioning;feeding cue recognition promoted;infant stimulated to wakeful state;infant latch-on verified;support offered;supplemental feeding provided   Maternal Breastfeeding Support encouragement offered;infant-mother separation minimized;lactation counseling provided   Latch Promotion positioning assisted;infant moved to breast;infant's mouth opened gently   Met mother ans father at Edel's bedside this morning; introduced self to parents; encouraged mother to hold Rapha skin to skin as often/long as able to help stimulate his innate breast feeding behaviors

## 2017-03-29 NOTE — PLAN OF CARE
Problem: Patient Care Overview  Goal: Plan of Care Review  Outcome: Ongoing (interventions implemented as appropriate)  Pumping for NICU baby

## 2017-03-29 NOTE — PROGRESS NOTES
Checked on Ms. Russell to see how she was doing and to ask her the EQ-5D health questionnaire. She stated she is doing well.     Having no problems walking about or with self care. Having no problems with usual activities. Moderate pain or discomfort. Not feeling anxious or depressed. She rated her health today at a 10. She was given her ClinCard with $40.00 loaded on to it. Told Ms. Russell I'd be calling in about 30 days to ask her the same questions as I did today.

## 2017-03-29 NOTE — PROGRESS NOTES
POSTPARTUM PROGRESS NOTE     Stephanie Russell is a 42 y.o. female POD #1 status post classical  section at 38w0d in a pregnancy complicated by known fetal Trisomy 21, DM2, MARIZA, and h/o  delivery. Delivery complicated by PPH (EBL 1300cc). Patient is doing well this morning. She reports nausea and emesis yesterday afternoon that has resolved.  Denies lightheadedness, palpitations, SOB, weakness.   Patient reports moderate abdominal pain that is adequately relieved by IV/oral pain medications. Also reports joint pain. Lochia is mild and stable. Patient is voiding without difficulty and ambulating with no difficulty. She has passed flatus, and has not had BM.  Patient does plan to breast feed. Does not wish to discuss contracpetion. Male infant in NICU.    Objective:       Temp:  [96.1 °F (35.6 °C)-99.3 °F (37.4 °C)] 99.2 °F (37.3 °C)  Pulse:  [62-87] 87  Resp:  [16-20] 18  SpO2:  [93 %-100 %] 97 %  BP: (111-144)/(59-84) 111/64    General:   alert, cooperative and no distress   Lungs:   clear to auscultation bilaterally   Heart:   regular rate and rhythm   Abdomen:  soft, nontender, nondistended, +BS   Uterus:  firm located at the umblicus.        Incision: Bandage in place, clean, dry and intact   Extremities: no edema, redness or tenderness in the calves or thighs       Lab Review  No results found for this or any previous visit (from the past 4 hour(s)).    I/O    Intake/Output Summary (Last 24 hours) at 17 0634  Last data filed at 17 0618   Gross per 24 hour   Intake             2000 ml   Output             3375 ml   Net            -1375 ml        Assessment:     Patient Active Problem List   Diagnosis    Trisomy 21 Fetus with AV Canal Defect    Elderly multigravida in third trimester    Fetal trisomy 21 affecting care of mother, antepartum    Pre-existing type 2 diabetes mellitus during pregnancy in third trimester    Trisomy 21 of fetus in current russell pregnancy    S/p  RLTCS on 3/28        Plan:   1. Postpartum care:  - Patient doing well. Continue routine management and advances.  - Continue PO pain meds. Pain well controlled.  - Heme: H/h 13/38, postpartum pending  - Encourage ambulation  - Circumcision: deferred as male infant in NICU  - Contraception: per primary OB  - Lactation consult prn, pumping for infant  - Rh positive    2. PPH (EBL 1300cc)  - s/p cytotec series  - fundus firm  - VSS, asymptomatic  - postop H/h in process    3. DM2     B               L                D                   N  3/28                  66>59>61    102(pp)          78          (90 at 0156)  3/29  - Regimen:    - AM NPH 24/Reg 15   - PM NPH 19/Reg 20   - Currently refusing insulin; discussed need to resume if glucose levels increase, especially as her nausea resolves and she begins tolerating regular diet. Will continue to monitor.    Dispo: As patient meets milestones, will plan to discharge POD 3-4.    Ofelia Gamboa

## 2017-03-29 NOTE — MEDICAL/APP STUDENT
Delivery Discharge Summary  Obstetrics      Primary OB Clinician: Dr. Sawant and Dr. Nunes    Admission date: 3/28/2017  Discharge date: 2017    Disposition: To home, self care    Admit Dx:      Patient Active Problem List   Diagnosis    Trisomy 21 Fetus with AV Canal Defect    Elderly multigravida in third trimester    Fetal trisomy 21 affecting care of mother, antepartum    Pre-existing type 2 diabetes mellitus during pregnancy in third trimester    Trisomy 21 of fetus in current russell pregnancy    S/p RLTCS on 3/28     Discharge Dx:    Patient Active Problem List   Diagnosis    Trisomy 21 Fetus with AV Canal Defect    Elderly multigravida in third trimester    Fetal trisomy 21 affecting care of mother, antepartum    Pre-existing type 2 diabetes mellitus during pregnancy in third trimester    Trisomy 21 of fetus in current russell pregnancy    S/p RLTCS on 3/28       Procedure: , due to h/o CS x2, known fetal trisomy 21 with AV canal defect    Hospital Course:  Stephanie Russell is a 42 y.o. now , POD #*** who was admitted on 3/28/2017 at 38wk0d for scheduled RLTCS. On initial assessment, vital signs were stable and physical exam was normal. Infant's head was in RLQ with shoulder presenting. Patient was subsequently admitted to labor and delivery unit with signed consents. ***Labor course was managed with epidural. Patient delivered a single viable  male. Please see delivery note for further details. Pt was in stable condition post delivery and was transferred to the Mother-Baby Unit. Her postpartum course was ***complicated by poorly controlled DM2. On discharge day, patient's pain is controlled with oral pain medications. Pt is tolerating ambulation without SOB or CP, and PO diet without N/V. Reports lochia is ***mild. Denies any HA, vision changes, F/C, LE swelling. Denies any breast pain/soreness.  Pt in stable condition and ready for discharge. She has  "been instructed to continue ***as indicated as well as pain medications as needed and to follow up in the OB clinic in ***6 weeks with her obstetrics provider.    Pertinent studies:  Postpartum CBC  Lab Results   Component Value Date    WBC 7.02 2017    HGB 13.2 2017    HCT 38.8 2017    MCV 82 2017     2017     ***    Tubal Ligation: n/a  Feeding Method: breast, EBM to NICU  Rh Immune Globulin Given(B POS): N/A  Rubella Vaccine Given: N/A  Tdap Vaccine Given: N/A    Delivery:    Episiotomy: None   Lacerations: None   Repair suture:     Repair # of packets:     Blood loss (ml): 0     Birth information:  YOB: 2016   Time of birth: 7:21 AM   Sex: male   Delivery type: , Classical   Gestational Age: 38w0d    Delivery Clinician:      Other providers:       Additional  information:  Forceps:    Vacuum:    Breech:    Observed anomalies      Living?:           APGARS  One minute Five minutes Ten minutes   Skin color:         Heart rate:         Grimace:         Muscle tone:         Breathing:         Totals: 4  9        Placenta: Delivered:       appearance      Patient Instructions:   Current Discharge Medication List      CONTINUE these medications which have NOT CHANGED    Details   ACCU-CHEK FASTCLIX Misc       aspirin 81 MG Chew Take 81 mg by mouth once daily.      !! BD INSULIN SYRINGE HALF UNIT 0.3 mL 31 gauge x 5/16" Syrg       !! BD INSULIN SYRINGE HALF UNIT 0.3 mL 31 gauge x 5/16" Syrg       NESTABS 32-1,000 mg-mcg Tab       NOVOLIN N 100 unit/mL injection       NOVOLIN R 100 unit/mL injection       ONETOUCH ULTRA TEST Strp       ONETOUCH ULTRAMINI kit        !! - Potential duplicate medications found. Please discuss with provider.          No discharge procedures on file.    ***    "

## 2017-03-29 NOTE — LACTATION NOTE
03/29/17 0945   Equipment Type/Education   Pump Type Symphony   Breast Pump Type double electric, hospital grade   Breast Pump Flange Type hard   Breast Pump Flange Size 27 mm   Breast Pumping milk labeled/stored;pumped until emptied   Pumping Frequency (times) (pump 8 or more times a day)   Lactation Referrals   Lactation Consult Knowledge deficit;Pump teaching   Lactation Interventions   Attachment Promotion breastfeeding assistance provided;face-to-face positioning promoted;rooming-in promoted;skin-to-skin contact encouraged;infant-mother separation minimized   Maternal Breastfeeding Support lactation counseling provided   Mother is pumping for her NICU baby. Baby may come down today from NICU per mother. She will call for latch asst if baby does come down.  Got a drop in collection container and will label it and send it to NICU. Mother will call LC with questions.

## 2017-03-29 NOTE — PROGRESS NOTES
Notified Dr Zaman of pt's POCT blood glucose of 78 and pt's refusal to take nightly NPH insulin. Educated pt on importance of taking scheduled insulin. MD to come see pt. No new orders received at this time. Will continue to monitor.

## 2017-03-30 LAB
POCT GLUCOSE: 138 MG/DL (ref 70–110)
POCT GLUCOSE: 159 MG/DL (ref 70–110)
POCT GLUCOSE: 99 MG/DL (ref 70–110)

## 2017-03-30 PROCEDURE — 25000003 PHARM REV CODE 250: Performed by: OBSTETRICS & GYNECOLOGY

## 2017-03-30 PROCEDURE — 63600175 PHARM REV CODE 636 W HCPCS: Performed by: OBSTETRICS & GYNECOLOGY

## 2017-03-30 PROCEDURE — 11000001 HC ACUTE MED/SURG PRIVATE ROOM

## 2017-03-30 PROCEDURE — 25000003 PHARM REV CODE 250: Performed by: STUDENT IN AN ORGANIZED HEALTH CARE EDUCATION/TRAINING PROGRAM

## 2017-03-30 RX ORDER — ADHESIVE BANDAGE
30 BANDAGE TOPICAL ONCE
Status: COMPLETED | OUTPATIENT
Start: 2017-03-30 | End: 2017-03-30

## 2017-03-30 RX ADMIN — STANDARDIZED SENNA CONCENTRATE AND DOCUSATE SODIUM 1 TABLET: 8.6; 5 TABLET, FILM COATED ORAL at 08:03

## 2017-03-30 RX ADMIN — SIMETHICONE CHEW TAB 80 MG 80 MG: 80 TABLET ORAL at 08:03

## 2017-03-30 RX ADMIN — OXYCODONE HYDROCHLORIDE AND ACETAMINOPHEN 1 TABLET: 10; 325 TABLET ORAL at 12:03

## 2017-03-30 RX ADMIN — OXYCODONE HYDROCHLORIDE AND ACETAMINOPHEN 1 TABLET: 10; 325 TABLET ORAL at 06:03

## 2017-03-30 RX ADMIN — MAGNESIUM HYDROXIDE 2400 MG: 400 SUSPENSION ORAL at 09:03

## 2017-03-30 RX ADMIN — OXYCODONE HYDROCHLORIDE AND ACETAMINOPHEN 1 TABLET: 10; 325 TABLET ORAL at 07:03

## 2017-03-30 RX ADMIN — IBUPROFEN 800 MG: 400 TABLET, FILM COATED ORAL at 07:03

## 2017-03-30 RX ADMIN — INSULIN HUMAN 20 UNITS: 100 INJECTION, SOLUTION PARENTERAL at 07:03

## 2017-03-30 RX ADMIN — IBUPROFEN 800 MG: 400 TABLET, FILM COATED ORAL at 06:03

## 2017-03-30 NOTE — LACTATION NOTE
This note was copied from a baby's chart.  LC visit to the room. Mother not in room. May be visiting baby.

## 2017-03-30 NOTE — LACTATION NOTE
This note was copied from a baby's chart.     03/30/17 1700   Infant Information   Infant's Name Edel   Maternal Infant Assessment   Breast Shape Bilateral:;pendulous;wide   Breast Density Bilateral:;soft   Areola Bilateral:;elastic   Nipple(s) Bilateral:;everted;graspable   Infant Assessment   Skin Color yellow (jaundice)   LATCH Score   Latch 0-->too sleepy or reluctant, no latch achieved   Audible Swallowing 0-->none   Type Of Nipple 2-->everted (after stimulation)   Comfort (Breast/Nipple) 2-->soft/nontender   Hold (Positioning) 1-->minimal assist, teach one side: mother does other, staff holds   Score (less than 7 for 2/more consecutive times, consult Lactation Consultant) 5   Pain/Comfort Assessments   Acceptable Comfort Level 0   Maternal Infant Feeding   Maternal Emotional State relaxed   Infant Positioning cradle   Breastfeeding Education importance of skin-to-skin contact   Feeding Infant   Effective Latch During Feeding no   Lactation Referrals   Lactation Consult Breastfeeding assessment   Lactation Interventions   Attachment Promotion breastfeeding assistance provided;face-to-face positioning promoted;infant-mother separation minimized;privacy provided;skin-to-skin contact encouraged   Breastfeeding Assistance infant stimulated to wakeful state;support offered;supplemental feeding provided   Maternal Breastfeeding Support encouragement offered;infant-mother separation minimized   Latch Promotion infant moved to breast;infant's mouth opened gently;suck stimulated with colostrum drop

## 2017-03-30 NOTE — PLAN OF CARE
Copied from infant's chart       17 1207   Discharge Assessment   Assessment Type Discharge Planning Assessment   Confirmed/corrected address and phone number on facesheet? Yes   Discharge Plan A Home with family     SOCIAL WORK DISCHARGE PLANNING ASSESSMENT    Sw completed discharge planning assessment with pt's parents at pt's bedside.  Pt's parents were easily engaged. Education on the role of  was provided. Emotional support provided throughout assessment.      Legal Name: Edel Russell :  3/28/2016    Patient Active Problem List   Diagnosis    Single liveborn, born in hospital, delivered by  delivery    Trisomy 21     hypoglycemia    Infant of a diabetic mother (IDM)    AV canal, balanced         Birth Hospital:[facility]   TUAN: 2016    Birth Weight: 3.714 kg (8 lb 3 oz)  Birth Length: 51.4cm  Gestational Age: 38w0d          [place] Assessment    Living status:  Yes   Apgars    1 Minute:   5 Minute:   10 Minute 15 Minute 20 Minute   Skin Color: 0  1       Heart Rate: 2  2       Reflex Irritability: 0  2       Muscle Tone: 1  2       Respiratory Effort: 1  2       Total: 4  9                  Apgars Assigned By:  NICU ATTENDED          Mother: Stephanie Russell, age 41,  1975  Address: [address] [place], [place] [address] [address]  Phone: [phone]  Employer: none    Job Title: none  Education: high school diploma       Father: Luis Enrique Russell  Address: [address] [place], [place] [address] [address]  Phone: [phone]  Employer: [employer]  Job Title: unknown  Education:  high school diploma  Signed Birth Certificate: Yes; parents are .    Support person(s): Stephon Marley ([facility]) [phone]    Sibling(s): Khayenna-5yo    Spiritual Affiliation: Yes  Apostolic    Commercial Insurance Coverage: No        [facility] Plan (formerly [place] Medicaid): Primary: Yes Secondary: No   [facility]     Pediatrician: Marylu bunn [place]       Nutrition: Expressed Breast Milk    Breast Pump:   Yes    Plans to obtain from WIC    WIC:   Mom already certified; will also apply for        Essential Items: (includes car seat, crib/bassinet/pack-n-play, clothing, bottles, diapers, etc.)  Acquired     Transportation: Personal vehicle     Education: Information given on CPR classes and Physician/NNP daily rounds.       Potential Eligibility for SSI Benefits:  Yes, however, not discussed as parents are not receptive to any information regarding Down Syndrome.    Potential Discharge Needs:  None     Pt's mother was dx prenatally.  However, dad and she remains in denial.  Sw to provided support.  Sw attempted to provide DSAGNO resource bag, but parents declined.  Sw informed them that resource bag is available should they want it.  Parents voiced understanding. Will follow.    Diamond Man LCSW  NICU   Ext. [phone]-phone  [email or web address]

## 2017-03-30 NOTE — MEDICAL/APP STUDENT
Delivery Discharge Summary  Obstetrics      Primary OB Clinician: Dr. Shilo Nunes    Admission date: 3/28/2017  Discharge date: 2017    Disposition: To home, self care    Admit Dx:      Patient Active Problem List   Diagnosis    Trisomy 21 Fetus with AV Canal Defect    Elderly multigravida in third trimester    Fetal trisomy 21 affecting care of mother, antepartum    Pre-existing type 2 diabetes mellitus during pregnancy in third trimester    Trisomy 21 of fetus in current russell pregnancy    S/p RLTCS on 3/28     Discharge Dx:    Patient Active Problem List   Diagnosis    Trisomy 21 Fetus with AV Canal Defect    Elderly multigravida in third trimester    Fetal trisomy 21 affecting care of mother, antepartum    Pre-existing type 2 diabetes mellitus during pregnancy in third trimester    Trisomy 21 of fetus in current russell pregnancy    S/p RLTCS on 3/28       Procedure:  due to prior two  deliveries and fetal trisomy 21 with AV canal defects.    Hospital Course:  Stephanie Russell is a 42 y.o. now , POD #*** who was admitted on 3/28/2017 at 38w0d for delivery via . Pregnancy was complicated by poorly controlled DM2, h/o CSx2, Fetal trisomy 21 with AV canal defect, AMA. On initial assessment, vital signs were stable and physical exam was normal. Infant's shoulder was presenting. Patient was subsequently admitted to labor and delivery unit with signed consents. Labor course was managed with combined spinal and epidural anesthesia.  Patient delivered a single viable  male. Please see delivery note for further details. Pt was transferred to the Mother-Baby Unit. Her postpartum course was complicated by PPH (EBL 1300cc). Reported nausea and emesis, which has since resolved. On discharge day, patient's pain is controlled with oral pain medications. Pt is tolerating ambulation without SOB or CP, and PO diet without N/V. Reports lochia is mild.  Denies any HA, vision changes, F/C, LE swelling. Denies any breast pain/soreness.  Pt in stable condition and ready for discharge. She has been instructed to continue ***as indicated as well as pain medications as needed and to follow up in the OB clinic in ***6 weeks with her obstetrics provider.    Pertinent studies:  Postpartum CBC  Lab Results   Component Value Date    WBC 12.42 2017    HGB 11.1 (L) 2017    HCT 32.8 (L) 2017    MCV 82 2017     2017     ***    Tubal Ligation: n/a  Feeding Method: breast  Rh Immune Globulin Given(B POS): N/A  Rubella Vaccine Given: N/A, reactive  Tdap Vaccine Given: no record    Delivery:    Episiotomy: None   Lacerations: None   Repair suture:     Repair # of packets:     Blood loss (ml): 0     Birth information:  YOB: 2016   Time of birth: 7:21 AM   Sex: male   Delivery type: , Classical   Gestational Age: 38w0d    Delivery Clinician:      Other providers:       Additional  information:  Forceps:    Vacuum:    Breech:    Observed anomalies      Living?:           APGARS  One minute Five minutes Ten minutes   Skin color:         Heart rate:         Grimace:         Muscle tone:         Breathing:         Totals: 4  9        Placenta: Delivered:       appearance      Patient Instructions:   Current Discharge Medication List      START taking these medications    Details   ibuprofen (ADVIL,MOTRIN) 800 MG tablet Take 1 tablet (800 mg total) by mouth every 6 (six) hours as needed (cramping/pain).  Qty: 30 tablet, Refills: 0    Associated Diagnoses: Status post repeat low transverse  section      oxycodone-acetaminophen (PERCOCET) 5-325 mg per tablet Take 1 tablet by mouth every 4 (four) hours as needed for Pain.  Qty: 30 tablet, Refills: 0    Associated Diagnoses: Status post repeat low transverse  section         CONTINUE these medications which have NOT CHANGED    Details   ACCU-CHEK FASTCLIX Misc      "  aspirin 81 MG Chew Take 81 mg by mouth once daily.      !! BD INSULIN SYRINGE HALF UNIT 0.3 mL 31 gauge x 5/16" Syrg       !! BD INSULIN SYRINGE HALF UNIT 0.3 mL 31 gauge x 5/16" Syrg       NESTABS 32-1,000 mg-mcg Tab       NOVOLIN N 100 unit/mL injection       NOVOLIN R 100 unit/mL injection       ONETOUCH ULTRA TEST Strp       ONETOUCH ULTRAMINI kit        !! - Potential duplicate medications found. Please discuss with provider.          No discharge procedures on file.    ***  "

## 2017-03-30 NOTE — PROGRESS NOTES
POSTPARTUM PROGRESS NOTE     Stephanie Russell is a 42 y.o. female POD #2 status post classical  section at 38w0d in a pregnancy complicated by known fetal Trisomy 21, DM2, MARIZA, and h/o  delivery. Delivery complicated by PPH (EBL 1300cc). Patient is doing well this morning. She reports nausea and emesis yesterday afternoon that has resolved.  Denies lightheadedness, palpitations, SOB, weakness.   Patient reports moderate abdominal pain that is adequately relieved by IV/oral pain medications. Also reports joint pain. Lochia is mild and stable. Patient is voiding without difficulty and ambulating with no difficulty. She has passed flatus, and has not had BM.  Patient does plan to breast feed. Does not wish to discuss contracpetion. Male infant in NICU.    Objective:       Temp:  [96.7 °F (35.9 °C)-99.4 °F (37.4 °C)] 96.7 °F (35.9 °C)  Pulse:  [] 100  Resp:  [18] 18  SpO2:  [99 %] 99 %  BP: (131-137)/(59-80) 137/59    General:   alert, cooperative and no distress   Lungs:   clear to auscultation bilaterally   Heart:   regular rate and rhythm   Abdomen:  soft, nontender, nondistended, +BS   Uterus:  firm located at the umblicus.        Incision: Bandage in place, clean, dry and intact   Extremities: no edema, redness or tenderness in the calves or thighs       Lab Review  Recent Results (from the past 4 hour(s))   POCT glucose    Collection Time: 17  6:05 AM   Result Value Ref Range    POCT Glucose 99 70 - 110 mg/dL       I/O    Intake/Output Summary (Last 24 hours) at 17 0721  Last data filed at 17 0900   Gross per 24 hour   Intake                0 ml   Output              500 ml   Net             -500 ml        Assessment:     Patient Active Problem List   Diagnosis    Trisomy 21 Fetus with AV Canal Defect    Elderly multigravida in third trimester    Fetal trisomy 21 affecting care of mother, antepartum    Pre-existing type 2 diabetes mellitus during pregnancy in third  trimester    Trisomy 21 of fetus in current [place] pregnancy    S/p RLTCS on 3/28        Plan:   1. Postpartum care:  - Patient doing well. Continue routine management and advances.  - Continue PO pain meds. Pain well controlled.  - Encourage ambulation  - Circumcision: deferred as male infant in NICU  - Contraception: per primary OB  - Lactation consult prn, pumping for infant  - Rh positive    2. PPH (EBL 1300cc)  - s/p cytotec series  - fundus firm  - VSS, asymptomatic  - H/H 13/38 -> 11/32    3. DM2     B               L                D                   N  3/28:                  66>59>61    102(pp)          78          (90 at 0156)  3/29:    84             127            135                183  3/30:    99  - Regimen:    - AM NPH 24/Reg 15   - PM NPH 19/Reg 20   - has been refusing insulin, although accepted pm NPH. Discussed need to adhere to regimen as accuchecks beginning to climb as she is better able to tolerate regular diet.    Dispo: As patient meets milestones, will plan to discharge POD 3-4.    Ofelia Gamboa     Patient POD #2   accuchecks reasonable in postpartum period   Continue with postoperative care

## 2017-03-30 NOTE — PROGRESS NOTES
Patient called RN to check CBG. States she had forgotten to call; has already eaten bread and hummus. CBG = 138 at this time.

## 2017-03-30 NOTE — PLAN OF CARE
"Asked pt if little girl (pt's daughter) would be staying over night. Pt said, "yes." Pt was told that children were not allowed to stay on the unit over night for any reason. Pt said, "Well, she is staying here tonight. We are from [place], and she stayed here all night last night and no one told us anything." Situation was reported to charge nurse, Mary. Mary went into pt's room to tell them that the child would not be able to stay on the unit the next night for any reason. Pt said that she would be going home in the morning. When pt was given 6 am meds, pt stated that she was not going home until 4/1. Conversation reported to charge nurse, Mary. Will continue to monitor.  "

## 2017-03-31 LAB
GLUCOSE SERPL-MCNC: 175 MG/DL (ref 70–110)
POCT GLUCOSE: 115 MG/DL (ref 70–110)
POCT GLUCOSE: 133 MG/DL (ref 70–110)
POCT GLUCOSE: 157 MG/DL (ref 70–110)
POCT GLUCOSE: 159 MG/DL (ref 70–110)
POCT GLUCOSE: 89 MG/DL (ref 70–110)

## 2017-03-31 PROCEDURE — 11000001 HC ACUTE MED/SURG PRIVATE ROOM

## 2017-03-31 PROCEDURE — 63600175 PHARM REV CODE 636 W HCPCS: Performed by: OBSTETRICS & GYNECOLOGY

## 2017-03-31 PROCEDURE — 25000003 PHARM REV CODE 250: Performed by: OBSTETRICS & GYNECOLOGY

## 2017-03-31 RX ADMIN — IBUPROFEN 800 MG: 400 TABLET, FILM COATED ORAL at 12:03

## 2017-03-31 RX ADMIN — IBUPROFEN 800 MG: 400 TABLET, FILM COATED ORAL at 06:03

## 2017-03-31 RX ADMIN — STANDARDIZED SENNA CONCENTRATE AND DOCUSATE SODIUM 1 TABLET: 8.6; 5 TABLET, FILM COATED ORAL at 09:03

## 2017-03-31 RX ADMIN — OXYCODONE HYDROCHLORIDE AND ACETAMINOPHEN 1 TABLET: 10; 325 TABLET ORAL at 12:03

## 2017-03-31 RX ADMIN — IBUPROFEN 800 MG: 400 TABLET, FILM COATED ORAL at 01:03

## 2017-03-31 RX ADMIN — OXYCODONE HYDROCHLORIDE AND ACETAMINOPHEN 1 TABLET: 10; 325 TABLET ORAL at 01:03

## 2017-03-31 RX ADMIN — OXYCODONE HYDROCHLORIDE AND ACETAMINOPHEN 1 TABLET: 10; 325 TABLET ORAL at 06:03

## 2017-03-31 RX ADMIN — HUMAN INSULIN 19 UNITS: 100 INJECTION, SUSPENSION SUBCUTANEOUS at 09:03

## 2017-03-31 RX ADMIN — HUMAN INSULIN 19 UNITS: 100 INJECTION, SUSPENSION SUBCUTANEOUS at 12:03

## 2017-03-31 RX ADMIN — IBUPROFEN 800 MG: 400 TABLET, FILM COATED ORAL at 08:03

## 2017-03-31 RX ADMIN — OXYCODONE HYDROCHLORIDE AND ACETAMINOPHEN 1 TABLET: 10; 325 TABLET ORAL at 04:03

## 2017-03-31 NOTE — LACTATION NOTE
03/31/17 1343   Maternal Infant Assessment   Breast Density Bilateral:;soft   Pain Reassessment   Pain Rating Prior to Med Admin 9   Maternal Infant Feeding   Maternal Emotional State independent;relaxed   Time Spent (min) 15-30 min   Breastfeeding Education adequate infant intake;adequate milk volume;increasing milk supply;label/storage of breast milk;milk expression, electric pump   Equipment Type/Education   Pump Type Symphony   Breast Pump Type double electric, hospital grade   Breast Pump Flange Type hard   Breast Pump Flange Size 27 mm   Pumping Frequency (times) (8-10 per day)   Lactation Referrals   Lactation Consult Follow up   Lactation Interventions   Attachment Promotion breastfeeding assistance provided   Maternal Breastfeeding Support diary/feeding log utilized;encouragement offered;infant-mother separation minimized;lactation counseling provided;maternal hydration promoted;maternal nutrition promoted;maternal rest encouraged   lactation rounds, pump education reviewed. Pt states that she has been breastfeeding ifnant in the NICU. Encouraged pt to continue to pump both breast after the baby nurses. Mother baby breast feeding guide given for pt use.

## 2017-03-31 NOTE — DISCHARGE INSTRUCTIONS
Preparation and Hygiene:    1. Shower daily.  2. Wear a clean bra each day and wash daily in warm soapy water.  3. Change wet or moist breast pads frequently.  Moist pads can promote growth of germs.  4. Actively wash your hands, paying close attention to the area around and under your fingernails, thoroughly with soap and water for 15 seconds before pumping or handling your milk.  Re-wash your hands if you touch anything (scratching your nose, answering the phone, etc) while pumping or handling your milk.   5. Before pumping your breasts, assemble the pump collection kit and have ready the sterile container and labels.  Place these items on a clean surface next to the breastpump.  6. Each time after you have finished pumping, take apart all of the parts of the breastpump collection kit and place them in a separate cleaning container (do not place them in the sink).  Be sure to remove the yellow valve from the breastshield and separate the white membrane from the yellow valve.  Rinse all of these parts with cool water.  Then use a new sponge and/or bottle brush and dishwashing detergent to clean the parts.  Rinse off the soapy water with cool water and air dry on a clean towel covered with a clean cloth.  All parts may also be washed after each use in the top rack of a .  7. Once each day, sterilize all of the parts of the breastpump collection kit.  This can be done by boiling the kit parts for 10 minutes or by using a Quick Clean Micro-Steam Bag made by [employer]  8. If condensation appears in the tubing, continue to run the pump with the tubing attached for 1-2 minutes or until the tubing is dry.   9. Notify your babys nurse or doctor if you become ill or need to take any medication, even over-the-counter medicines.        Collection and Storage of Expressed Breastmilk:         1. Pump your breasts at least 8-10 times every 24 hours.  Double pump (both breasts at  the same time) for at least 15-20  minutes using the most suction that is comfortable.    2. Write the date and time of pumping and the name of any medications you are takingon the babys pre-printed hospital identification label.   3. Place your babys pre-printed hospital identification label on each container of breastmilk.  Additional pre-printed labels can be obtained from your babys nurse.  If your expressed breastmilk is not correctly labeled, the nurse cannot feed the milk to the baby.       4. Place a brightly colored sticker on the top of each container of milk pumped during the first 30 days.  This identifies the milk as special and having higher levels of nutrients and anti-infective properties that are so important for your baby.  Additional stickers can be obtained from the lactation consultants or your babys nurse.  5.   Do not touch the inside of the storage containers or lids.  6.      Pour the amount of expressed milk needed for 1 of your babys feedings into each   storage container. Use a new container(s) for each pumping.  Additional storage   containers can be obtained from your babys nurse.        7.       Tightly screw the lid onto the container and place immediately into the       refrigerator fordaily transportation to the hospital.   Do not freeze your milk      unless asked to do so by your babys nurse.  However, if you are not able to      visit your baby each day, place the expressed breastmilk in the freezer.  8.   Expressed breastmilk should be refrigerated or frozen within 1 hour of      pumping.  9.      Do not store expressed breastmilk on the door of your refrigerator or freezer where the temperature is warmer.         Transportation of Expressed Breastmilk:    1. Refrigerated breastmilk or frozen milk should be packed tightly together in a cooler with frozen, blue gel-packs to keep the milk frozen.  DO NOT USE ICE CUBES (WET ICE) TO TRANSPORT FROZEN MILK.   A clean towel can be used to fill any extra space  between containers of frozen milk.  2.    Bring your expressed milk from home each time you visit the baby.                Breastfeeding Discharge Instructions       Feed the baby at the earliest sign of hunger or comfort  o Hands to mouth, sucking motions  o Rooting or searching for something to suck on  o Dont wait for crying - it is a sign of distress     The feedings may be 8-12 times per 24hrs and will not follow a schedule   Avoid pacifiers and bottles for the first 4 weeks   Alternate the breast you start the feeding with, or start with the breast that feels the fullest   Switch breasts when the baby takes himself off the breast or falls asleep   Keep offering breasts until the baby looks full, no longer gives hunger signs, and stays asleep when placed on his back in the crib   If the baby is sleepy and wont wake for a feeding, put the baby skin-to-skin dressed in a diaper against the mothers bare chest   Sleep near your baby   The baby should be positioned and latched on to the breast correctly  o Chest-to-chest, chin in the breast  o Babys lips are flipped outward  o Babys mouth is stretched open wide like a shout  o Babys sucking should feel like tugging to the mother  - The baby should be drinking at the breast:  o You should hear swallowing or gulping throughout the feeding  o You should see milk on the babys lips when he comes off the breast  o Your breasts should be softer when the baby is finished feeding  o The baby should look relaxed at the end of feedings  o After the 4th day and your milk is in:  o The babys poop should turn bright yellow and be loose, watery, and seedy  o The baby should have at least 3-4 poops the size of the palm of your hand per day  o The baby should have at least 5-6 wet diapers per day  o The urine should be light yellow in color  You should drink when you are thirsty and eat a healthy diet when you are    hungry.     Take naps to get the rest you  need.   Take medications and/or drink alcohol only with permission of your obstetrician    or the babys pediatrician.  You can also call the [facility],   ([phone]), Monday-Friday, 8am-5pm Central time, to get the most   up-to-date evidence-based information on the use of medications during   pregnancy and breastfeeding.      The baby should be examined by a pediatrician at 3-5 days of age.  Once your   milk comes in, the baby should be gaining at least ½ - 1oz each day and should be back to birthweight no later than 10-14 days of age.          Community Resources    [facility] Breastfeeding Warmline: [phone]   Local [facility] clinics: provide incentives and breastpumps to eligible mothers  La Leche [employer] (LLLI):  mother-to-mother support group website        www.Revelationl.[employer]  Local La Leche League mother-to-mother support groups:        www.[employer]        La Leche League [facility]   Dr. Vincent Boudreaux website for latch videos and general information:        www.breastfeedinginc.ca  [facility] is a call center that provides information about the safety of taking medications while breastfeeding.  Call 5-639-424-9731, M-F, 8am-5pm, [place].  International Lactation Consultant Association provides resources for assistance:        www.ilca.org  [place] Breastfeeding [employer] provides informationand resources for parents  and the community    http://[facility].org     Alycia Kelly is a mom-to-mom support group:                             www.nolanKartela.com//breastfeedng-support/  [employer] Healthy Babies:  5-134-473-BABY(5262)  [employer]: a breastfeeding support group for women of color, [phone]

## 2017-03-31 NOTE — PROGRESS NOTES
POSTPARTUM PROGRESS NOTE     Stephanie Russell is a 42 y.o. female POD #3 status post classical  section at 38w0d in a pregnancy complicated by known fetal Trisomy 21, DM2, MARIZA, and h/o  delivery. Delivery complicated by PPH (EBL 1300cc). Patient is doing well this morning.   Denies N/V, lightheadedness, palpitations, SOB, weakness.   Patient reports moderate abdominal pain that is well relieved by oral pain medications.  Lochia is mild and stable. Patient is voiding without difficulty and ambulating with no difficulty. She has passed flatus, and has not had BM.  Patient does plan to breast feed. Does not wish to discuss contracpetion. Male infant in NICU.    Objective:       Temp:  [98.3 °F (36.8 °C)-99.8 °F (37.7 °C)] 98.3 °F (36.8 °C)  Pulse:  [] 78  Resp:  [16-18] 16  SpO2:  [100 %] 100 %  BP: (126-145)/(67-83) 126/67    General:   alert, cooperative and no distress   Lungs:   clear to auscultation bilaterally   Heart:   regular rate and rhythm   Abdomen:  soft, nontender, nondistended, +BS   Uterus:  firm located at the umblicus.        Incision: Incision intact without erythema, edema, or drainage   Extremities: no edema, redness or tenderness in the calves or thighs       Lab Review  No results found for this or any previous visit (from the past 4 hour(s)).    I/O  No intake or output data in the 24 hours ending 17 0649     Assessment:     Patient Active Problem List   Diagnosis    Trisomy 21 Fetus with AV Canal Defect    Elderly multigravida in third trimester    Fetal trisomy 21 affecting care of mother, antepartum    Pre-existing type 2 diabetes mellitus during pregnancy in third trimester    Trisomy 21 of fetus in current russell pregnancy    S/p RLTCS on 3/28        Plan:   1. Postpartum care:  - Patient doing well. Continue routine management and advances.  - Continue PO pain meds. Pain well controlled.  - Encourage ambulation  - Circumcision: deferred as male  infant in NICU  - Contraception: per primary OB  - Lactation consult prn, pumping for infant  - Rh positive    2. PPH (EBL 1300cc)  - s/p cytotec series  - fundus firm  - VSS, asymptomatic  - H/H 13/38 -> 11/32    3. DM2     B               L                D                   N  3/28:                  66>59>61    102(pp)          78          (90 at 0156)  3/29:    84             127            135                183  3/30:    99             138            159                157       - Regimen:    - AM NPH 24/Reg 15   - PM NPH 19/Reg 20   - intermittently refusing insulin    Dispo: As patient meets milestones, will plan to discharge POD 4 and infant in NICU    Ofelia Gamboa

## 2017-03-31 NOTE — LACTATION NOTE
"This note was copied from a baby's chart.  Lactation note:  Assisted mom with latch. Infant very floppy and sleepy with low tone and activity. Instructed mom on holding infant in football hold and cross cradle hold. Discussed signs of deep latch and transfer of breast milk. Infant very sleepy; latched on/off for several minutes but no rhythmical suckling achieved. When discussing normal characteristics of infant's with Trisomy 21, mom voiced "we are not claiming that". Encouragement offered.  Nicole Escalona, PRESLEYN, RN, CLC, IBCLC      "

## 2017-03-31 NOTE — PLAN OF CARE
Problem: Breastfeeding (Adult,Obstetrics,Pediatric)  Goal: Signs and Symptoms of Listed Potential Problems Will be Absent, Minimized or Managed (Breastfeeding)  Signs and symptoms of listed potential problems will be absent, minimized or managed by discharge/transition of care (reference Breastfeeding (Adult,Obstetrics,Pediatric) CPG).   Outcome: Ongoing (interventions implemented as appropriate)    03/31/17 1353   Breastfeeding   Problems Assessed (Breastfeeding) all   Problems Present (Breastfeeding) other (see comments)   Continue to pump 8-10 times per 24 hours. Pump after nursing infant. Use and care of pump reviewed. Pt [facility] with [facility].

## 2017-04-01 LAB
POCT GLUCOSE: 102 MG/DL (ref 70–110)
POCT GLUCOSE: 128 MG/DL (ref 70–110)
POCT GLUCOSE: 145 MG/DL (ref 70–110)
POCT GLUCOSE: 175 MG/DL (ref 70–110)
POCT GLUCOSE: 291 MG/DL (ref 70–110)

## 2017-04-01 PROCEDURE — 11000001 HC ACUTE MED/SURG PRIVATE ROOM

## 2017-04-01 PROCEDURE — 25000003 PHARM REV CODE 250: Performed by: OBSTETRICS & GYNECOLOGY

## 2017-04-01 PROCEDURE — 63600175 PHARM REV CODE 636 W HCPCS: Performed by: OBSTETRICS & GYNECOLOGY

## 2017-04-01 RX ORDER — DOCUSATE SODIUM 50 MG/5ML
50 LIQUID ORAL 2 TIMES DAILY PRN
Status: DISCONTINUED | OUTPATIENT
Start: 2017-04-01 | End: 2017-04-02 | Stop reason: HOSPADM

## 2017-04-01 RX ADMIN — IBUPROFEN 800 MG: 400 TABLET, FILM COATED ORAL at 06:04

## 2017-04-01 RX ADMIN — STANDARDIZED SENNA CONCENTRATE AND DOCUSATE SODIUM 1 TABLET: 8.6; 5 TABLET, FILM COATED ORAL at 09:04

## 2017-04-01 RX ADMIN — OXYCODONE HYDROCHLORIDE AND ACETAMINOPHEN 1 TABLET: 10; 325 TABLET ORAL at 06:04

## 2017-04-01 RX ADMIN — IBUPROFEN 800 MG: 400 TABLET, FILM COATED ORAL at 01:04

## 2017-04-01 RX ADMIN — HUMAN INSULIN 19 UNITS: 100 INJECTION, SUSPENSION SUBCUTANEOUS at 09:04

## 2017-04-01 NOTE — PROGRESS NOTES
POSTPARTUM PROGRESS NOTE     Stephanie Russell is a 42 y.o. female POD #4 status post classical  section at 38w0d in a pregnancy complicated by known fetal Trisomy 21, DM2, MARIZA, and h/o  delivery. Delivery complicated by PPH (EBL 1300cc). Patient is doing well this morning.  She is sitting up in bed eating breakfast.  She denies N/V, lightheadedness, palpitations, SOB, weakness.   Patient reports mild abdominal pain that is well relieved by oral pain medications.  Lochia is mild and stable. Patient is voiding without difficulty and ambulating with no difficulty. She has passed flatus, and has not had BM.  Patient does plan to breast feed. Did not discuss contracpetion. Male infant in NICU.    Objective:       Temp:  [97.9 °F (36.6 °C)-98.7 °F (37.1 °C)] 98.4 °F (36.9 °C)  Pulse:  [70-78] 73  Resp:  [18] 18  SpO2:  [99 %] 99 %  BP: (131-143)/(66-89) 143/66    General:   alert, cooperative and no distress   Lungs:   clear to auscultation bilaterally   Heart:   regular rate and rhythm   Abdomen:  soft, nontender, nondistended, +BS   Uterus:  firm located at the umblicus.        Incision: Incision intact without erythema, edema, or drainage, steri strips in place   Extremities: Mild bilteral edema, redness or tenderness in the calves or thighs       Lab Review  Recent Results (from the past 4 hour(s))   POCT glucose    Collection Time: 17  9:49 AM   Result Value Ref Range    POCT Glucose 102 70 - 110 mg/dL       I/O  No intake or output data in the 24 hours ending 17 0956     Assessment:     Patient Active Problem List   Diagnosis    Trisomy 21 Fetus with AV Canal Defect    Elderly multigravida in third trimester    Fetal trisomy 21 affecting care of mother, antepartum    Pre-existing type 2 diabetes mellitus during pregnancy in third trimester    Trisomy 21 of fetus in current russell pregnancy    S/p RLTCS on 3/28        Plan:   1. Postpartum care:  - Patient doing well.  Continue routine management and advances.  - Continue PO pain meds. Pain well controlled.  - Encourage ambulation  - Circumcision: deferred as male infant in NICU  - Contraception: per primary OB  - Lactation consult prn, pumping for infant  - Rh positive    2. PPH (EBL 1300cc)  - s/p cytotec series  - fundus firm  - VSS, asymptomatic  - H/H 13/38 -> 11/32    3. DM2     B               L                D                   N  3/28:                  66>59>61    102(pp)          78          (90 at 0156)  3/29:    84             127            135                183  3/30:    99             138            159                157       3/31:    89             115            133                159     4/1:     102   - Regimen:    - AM NPH 24/Reg 15   - PM NPH 19/Reg 20   - intermittently refusing insulin    Dispo: As patient meets milestones, will plan to discharge POD 5, infant in NICU. Will keep one more day for blood sugar monitoring    Rabia Anderson

## 2017-04-01 NOTE — PROGRESS NOTES
Dr. Anderson notified that patient refused nph and regular insulin this morning. Blood glucose 102.

## 2017-04-01 NOTE — PROGRESS NOTES
Patient called to say she was going to feed baby in NICU, but would return for breakfast and will call RN for blood glucose check.

## 2017-04-02 VITALS
TEMPERATURE: 99 F | DIASTOLIC BLOOD PRESSURE: 65 MMHG | HEART RATE: 82 BPM | OXYGEN SATURATION: 99 % | HEIGHT: 70 IN | RESPIRATION RATE: 18 BRPM | BODY MASS INDEX: 38.51 KG/M2 | SYSTOLIC BLOOD PRESSURE: 129 MMHG | WEIGHT: 269 LBS

## 2017-04-02 LAB
POCT GLUCOSE: 125 MG/DL (ref 70–110)
POCT GLUCOSE: 93 MG/DL (ref 70–110)

## 2017-04-02 PROCEDURE — 25000003 PHARM REV CODE 250: Performed by: OBSTETRICS & GYNECOLOGY

## 2017-04-02 RX ADMIN — IBUPROFEN 800 MG: 400 TABLET, FILM COATED ORAL at 04:04

## 2017-04-02 RX ADMIN — OXYCODONE HYDROCHLORIDE AND ACETAMINOPHEN 1 TABLET: 10; 325 TABLET ORAL at 04:04

## 2017-04-02 RX ADMIN — OXYCODONE HYDROCHLORIDE AND ACETAMINOPHEN 1 TABLET: 10; 325 TABLET ORAL at 01:04

## 2017-04-02 RX ADMIN — IBUPROFEN 800 MG: 400 TABLET, FILM COATED ORAL at 01:04

## 2017-04-02 NOTE — LACTATION NOTE
"This note was copied from a baby's chart.  NICU Lactation Discharge Note:    Latch assist: Mom denied latch assistance. Mom plans to "practice in the comfort of her own home".    Discussed importance of a deep latch, signs of a good latch, signs of milk transfer, and how to know if baby is getting enough.  Feeding plan for home: mom to attempt to take infant to breast 1-2 x/day with feeding cues x 5-10 minutes; supplement with full feeding after going to breast. Under the guidance of pediatrician, progress with breast feeding as infant tolerates and shows signs of transfer and interest.   Completed NICU lactation discharge teaching with good understanding verbalized by mother.  Provided mother with written handouts to reinforce verbal instructions.  Provided mother with list of lactation community resources as well as NICU lactation contact numbers.    Nicole Escalona, PRESLEYN, RN, CLC, IBCLC      "

## 2017-04-02 NOTE — PROGRESS NOTES
Dr. Gamboa notified that patient did not call before dinner to have blood sugar checked and is refusing dinner insulin. MD would like blood sugar checked.

## 2017-04-02 NOTE — PROGRESS NOTES
POSTPARTUM PROGRESS NOTE     Stephanie Russell is a 42 y.o. female POD #5 status post classical  section at 38w0d in a pregnancy complicated by known fetal Trisomy 21, DM2, MARIZA, and h/o  delivery. Delivery complicated by PPH (EBL 1300cc). Patient is doing well this morning.  She denies N/V, lightheadedness, palpitations, SOB, weakness.    Patient reports mild abdominal pain that is well relieved by oral pain medications.  Lochia is mild and stable. Patient is voiding without difficulty and ambulating with no difficulty. She has passed flatus, and has not had BM.  Patient does plan to breast feed. Did not discuss contracpetion. Male infant in NICU.    Objective:       Temp:  [98.4 °F (36.9 °C)] 98.4 °F (36.9 °C)  Pulse:  [73-97] 97  Resp:  [18] 18  BP: (134-143)/(66-67) 134/67    General:   alert, cooperative and no distress, sitting up in chair   Lungs:   clear to auscultation bilaterally   Heart:   regular rate and rhythm   Abdomen:  soft, nontender, nondistended, +BS   Uterus:  firm located at the umblicus.        Incision: Incision intact without erythema, edema, or drainage, steri strips in place   Extremities: Mild bilteral edema, redness or tenderness in the calves or thighs       Lab Review  No results found for this or any previous visit (from the past 4 hour(s)).    I/O  No intake or output data in the 24 hours ending 17 0623     Assessment:     Patient Active Problem List   Diagnosis    Trisomy 21 Fetus with AV Canal Defect    Elderly multigravida in third trimester    Fetal trisomy 21 affecting care of mother, antepartum    Pre-existing type 2 diabetes mellitus during pregnancy in third trimester    Trisomy 21 of fetus in current russell pregnancy    S/p RLTCS on 3/28        Plan:   1. Postpartum care:  - Patient doing well. Continue routine management and advances.  - Continue PO pain meds. Pain well controlled.  - Encourage ambulation  - Circumcision: deferred as male  infant in NICU  - Contraception: per primary OB  - Lactation consult prn, pumping for infant  - Rh positive    2. PPH (EBL 1300cc)  - s/p cytotec series  - fundus firm  - VSS, asymptomatic  - H/H 13/38 -> 11/32    3. DM2     B               L                D                   N  3/28:                  66>59>61    102(pp)          78          (90 at 0156)  3/29:    84             127            135                183  3/30:    99             138            159                157       3/31:    89             115            133                159     4/1:     102            128           291*          145                                                         *taken immediately after dinner, reports ate a brownie  - Regimen:    - AM NPH 24/Reg 15   - PM NPH 19/Reg 20   - continues to intermittently refuse insulin. Did accept NPH last night. Counseled again on importance of adhering to insulin regimen as well as diabetic diet.    Dispo: As patient meets milestones, will plan to discharge POD 5    Ofelia Gamboa

## 2017-04-02 NOTE — PROGRESS NOTES
Dr. Luna notified of blood glucose 291 after dinner. Night RN will try to give nightly NPH insulin.

## 2017-04-02 NOTE — PROGRESS NOTES
Discharge teaching with mother care booklet, papers, and prescriptions provided to patient. Questions answered. Patient will DC ambulatory to NICU before going home.

## 2017-04-02 NOTE — PLAN OF CARE
Problem: Breastfeeding (Adult,Obstetrics,Pediatric)  Goal: Signs and Symptoms of Listed Potential Problems Will be Absent, Minimized or Managed (Breastfeeding)  Signs and symptoms of listed potential problems will be absent, minimized or managed by discharge/transition of care (reference Breastfeeding (Adult,Obstetrics,Pediatric) CPG).   Outcome: Ongoing (interventions implemented as appropriate)    04/02/17 1100   Breastfeeding   Problems Assessed (Breastfeeding) all   Problems Present (Breastfeeding) ineffective breastfeeding;other (see comments)   Plan of care provided for nursing and pumping with NICU DC. Hand pump and electric pumping discussed. Acknowledged understanding. Resources provided.

## 2017-04-02 NOTE — DISCHARGE SUMMARY
Delivery Discharge Summary  Obstetrics      Primary OB Clinician: Dr. Shilo Nunes    Admission date: 3/28/2017  Discharge date: 2017    Disposition: To home, self care    Admit Dx:      Patient Active Problem List   Diagnosis    Trisomy 21 Fetus with AV Canal Defect    Elderly multigravida in third trimester    Fetal trisomy 21 affecting care of mother, antepartum    Pre-existing type 2 diabetes mellitus during pregnancy in third trimester    Trisomy 21 of fetus in current russell pregnancy    S/p RLTCS on 3/28     Discharge Dx:    Patient Active Problem List   Diagnosis    Trisomy 21 Fetus with AV Canal Defect    Elderly multigravida in third trimester    Fetal trisomy 21 affecting care of mother, antepartum    Pre-existing type 2 diabetes mellitus during pregnancy in third trimester    Trisomy 21 of fetus in current russell pregnancy    S/p RLTCS on 3/28       Procedure:  due to prior two  deliveries and fetal trisomy 21 with AV canal defects.    Hospital Course:  Stephanie Russell is a 42 y.o. now , POD #5 who was admitted on 3/28/2017 at 38w0d for scheduled RLTCS. Pregnancy was complicated by poorly controlled DM2, h/o CSx2, Fetal trisomy 21 with AV canal defect, AMA. On initial assessment, vital signs were stable and physical exam was normal. Patient was subsequently admitted to labor and delivery unit with signed consents. Infant shoulder noted to be presenting.  Patient delivered a single viable  male. Please see delivery note for further details. Pt was transferred to the Mother-Baby Unit. Her postpartum course was complicated by PPH (EBL 1300cc) and uncontrolled blood glucoses (patient inconsistently accepting insulin). On discharge day, patient's pain is controlled with oral pain medications. Pt is tolerating ambulation without SOB or CP, and PO diet without N/V. Reports lochia is mild. Denies any HA, vision changes, F/C, LE swelling. Denies  any breast pain/soreness.  Pt in stable condition and ready for discharge. She has been instructed to continue insulin as indicated as well as pain medications as needed and to follow up in the OB clinic in 6 weeks with her obstetrics provider.    Pertinent studies:  Postpartum CBC  Lab Results   Component Value Date    WBC 12.42 2017    HGB 11.1 (L) 2017    HCT 32.8 (L) 2017    MCV 82 2017     2017         Tubal Ligation: n/a  Feeding Method: breast  Rh Immune Globulin Given(B POS): N/A  Rubella Vaccine Given: N/A, reactive  Tdap Vaccine Given: to be given prior to discharge    Delivery:    Episiotomy: None   Lacerations: None   Repair suture:     Repair # of packets:     Blood loss (ml): 0     Birth information:  YOB: 2016   Time of birth: 7:21 AM   Sex: male   Delivery type: , Classical   Gestational Age: 38w0d    Delivery Clinician:      Other providers:       Additional  information:  Forceps:    Vacuum:    Breech:    Observed anomalies      Living?:           APGARS  One minute Five minutes Ten minutes   Skin color:         Heart rate:         Grimace:         Muscle tone:         Breathing:         Totals: 4  9        Placenta: Delivered:       appearance      Patient Instructions:   Current Discharge Medication List      START taking these medications    Details   ibuprofen (ADVIL,MOTRIN) 800 MG tablet Take 1 tablet (800 mg total) by mouth every 6 (six) hours as needed (cramping/pain).  Qty: 30 tablet, Refills: 0    Associated Diagnoses: Status post repeat low transverse  section      oxycodone-acetaminophen (PERCOCET) 5-325 mg per tablet Take 1 tablet by mouth every 4 (four) hours as needed for Pain.  Qty: 30 tablet, Refills: 0    Associated Diagnoses: Status post repeat low transverse  section         CONTINUE these medications which have NOT CHANGED    Details   ACCU-CHEK FASTCLIX Misc       aspirin 81 MG Chew Take 81 mg by mouth  "once daily.      !! BD INSULIN SYRINGE HALF UNIT 0.3 mL 31 gauge x 5/16" Syrg       !! BD INSULIN SYRINGE HALF UNIT 0.3 mL 31 gauge x 5/16" Syrg       NESTABS 32-1,000 mg-mcg Tab       NOVOLIN N 100 unit/mL injection       NOVOLIN R 100 unit/mL injection       ONETOUCH ULTRA TEST Strp       ONETOUCH ULTRAMINI kit        !! - Potential duplicate medications found. Please discuss with provider.          No discharge procedures on file.    Ofelia Luna MD  OB/GYN, PGY-1    "

## 2017-04-02 NOTE — PROGRESS NOTES
Notified Dr. Ayala of pt c/o of 10/10 pain in her hands bilaterally. Pt states she experienced tingling and numbness of her hands intermittently during the end of pregnancy. Pt states that this is the worst it has been. Hands are swollen bilaterally. Orders received to encourage pt to drink and to have pt elevated hands.

## 2017-04-27 ENCOUNTER — TELEPHONE (OUTPATIENT)
Dept: RESEARCH | Facility: HOSPITAL | Age: 42
End: 2017-04-27

## 2017-04-27 ENCOUNTER — RESEARCH ENCOUNTER (OUTPATIENT)
Dept: RESEARCH | Facility: HOSPITAL | Age: 42
End: 2017-04-27

## 2017-04-27 NOTE — PROGRESS NOTES
DIANA- Follow up     Spoke with Ms. Russell on the phone for the 30 day follow up phone call for DIANA. She was seen by Dr. Chanel Sawant in [place], [place] during the week of 4/10-14 for an incision check. Patient self reported that the doctor said the incision looked great. She saw Dr. Sawant on Tuesday 4/25/17 for her post-partum follow up and said everything went well.     She was seen in the emergency room at [facility] on Saturday 4/22/17 because she felt her incision was irritated and infected. She claimed the ED doctor's told her it wasn't infected and looked well healed but she insisted on an antibiotic still. She was given a prescription for amoxicillin.     Ms. Russell stated that the baby is doing well and hasn't seen a doctor or been the the ED.

## 2018-08-16 ENCOUNTER — TELEPHONE (OUTPATIENT)
Dept: OBSTETRICS AND GYNECOLOGY | Facility: CLINIC | Age: 43
End: 2018-08-16

## 2018-08-21 ENCOUNTER — INITIAL PRENATAL (OUTPATIENT)
Dept: OBSTETRICS AND GYNECOLOGY | Facility: CLINIC | Age: 43
End: 2018-08-21
Payer: MEDICAID

## 2018-08-21 VITALS
WEIGHT: 253.75 LBS | SYSTOLIC BLOOD PRESSURE: 112 MMHG | BODY MASS INDEX: 36.41 KG/M2 | DIASTOLIC BLOOD PRESSURE: 70 MMHG

## 2018-08-21 DIAGNOSIS — O09.522 ELDERLY MULTIGRAVIDA IN SECOND TRIMESTER: ICD-10-CM

## 2018-08-21 DIAGNOSIS — Z98.891 H/O CESAREAN SECTION: ICD-10-CM

## 2018-08-21 DIAGNOSIS — E66.9 OBESITY, UNSPECIFIED CLASSIFICATION, UNSPECIFIED OBESITY TYPE, UNSPECIFIED WHETHER SERIOUS COMORBIDITY PRESENT: ICD-10-CM

## 2018-08-21 DIAGNOSIS — Z79.4 TYPE 2 DIABETES MELLITUS WITHOUT COMPLICATION, WITH LONG-TERM CURRENT USE OF INSULIN: ICD-10-CM

## 2018-08-21 DIAGNOSIS — O99.212 OBESITY AFFECTING PREGNANCY IN SECOND TRIMESTER: ICD-10-CM

## 2018-08-21 DIAGNOSIS — O24.414 INSULIN CONTROLLED GESTATIONAL DIABETES MELLITUS (GDM) IN SECOND TRIMESTER: ICD-10-CM

## 2018-08-21 DIAGNOSIS — E11.9 TYPE 2 DIABETES MELLITUS WITHOUT COMPLICATION, WITH LONG-TERM CURRENT USE OF INSULIN: ICD-10-CM

## 2018-08-21 DIAGNOSIS — Z87.74 H/O CONGENITAL CARDIAC SEPTAL DEFECT: ICD-10-CM

## 2018-08-21 DIAGNOSIS — O09.299 DOWN SYNDROME IN CHILD OF PRIOR PREGNANCY, CURRENTLY PREGNANT: ICD-10-CM

## 2018-08-21 PROBLEM — O09.529 AMA (ADVANCED MATERNAL AGE) MULTIGRAVIDA 35+: Status: ACTIVE | Noted: 2018-08-21

## 2018-08-21 PROBLEM — O24.113 PRE-EXISTING TYPE 2 DIABETES MELLITUS DURING PREGNANCY IN THIRD TRIMESTER: Status: RESOLVED | Noted: 2017-03-07 | Resolved: 2018-08-21

## 2018-08-21 PROBLEM — O35.13X0 TRISOMY 21 OF FETUS IN CURRENT SINGLETON PREGNANCY: Status: RESOLVED | Noted: 2017-03-28 | Resolved: 2018-08-21

## 2018-08-21 PROBLEM — O09.523 ELDERLY MULTIGRAVIDA IN THIRD TRIMESTER: Status: RESOLVED | Noted: 2017-03-07 | Resolved: 2018-08-21

## 2018-08-21 PROBLEM — O99.210 OBESITY COMPLICATING PREGNANCY: Status: ACTIVE | Noted: 2018-08-21

## 2018-08-21 PROBLEM — O35.13X0 FETAL TRISOMY 21 AFFECTING CARE OF MOTHER, ANTEPARTUM: Status: RESOLVED | Noted: 2017-03-07 | Resolved: 2018-08-21

## 2018-08-21 PROCEDURE — 99213 OFFICE O/P EST LOW 20 MIN: CPT | Mod: PBBFAC,TH,PO | Performed by: STUDENT IN AN ORGANIZED HEALTH CARE EDUCATION/TRAINING PROGRAM

## 2018-08-21 PROCEDURE — 99999 PR PBB SHADOW E&M-EST. PATIENT-LVL III: CPT | Mod: PBBFAC,,, | Performed by: STUDENT IN AN ORGANIZED HEALTH CARE EDUCATION/TRAINING PROGRAM

## 2018-08-21 PROCEDURE — 99213 OFFICE O/P EST LOW 20 MIN: CPT | Mod: TH,S$PBB,, | Performed by: STUDENT IN AN ORGANIZED HEALTH CARE EDUCATION/TRAINING PROGRAM

## 2018-08-21 NOTE — PROGRESS NOTES
Complaints today: none, here for routine OB visit.  Good fm.  Denies ctx, vb, lof    Patient states she usually receives her care in [place] however, she requests transfer of care.     /70   Wt 115.1 kg (253 lb 12 oz)   BMI 36.41 kg/m²     43 y.o., at Unknown by Estimated Date of Delivery: None noted.  Patient Active Problem List   Diagnosis    Trisomy 21 Fetus with AV Canal Defect    Elderly multigravida in third trimester    Fetal trisomy 21 affecting care of mother, antepartum    Pre-existing type 2 diabetes mellitus during pregnancy in third trimester    Trisomy 21 of fetus in current cordon pregnancy    S/p RLTCS on 3/28     OB History    Para Term  AB Living   8 4 3 1 3 2   SAB TAB Ectopic Multiple Live Births   2   1 0 2      # Outcome Date GA Lbr Nikhil/2nd Weight Sex Delivery Anes PTL Lv   8 Current            7 SAB 2017           6 Term 17 38w0d  3.714 kg (8 lb 3 oz) M CS-Classical Spinal N MITCHELL   5 SAB    0.4 kg (14.1 oz) M   Y FD   4     0.5 kg (1 lb 1.6 oz) M   Y FD      Complications: Nuchal cord   3 Term    3.175 kg (7 lb) F CS-LTranv      2 Ectopic            1 Term    4.649 kg (10 lb 4 oz) M   N MITCHELL      Complications: Failure to progress in labor          Dating reviewed    Allergies and problem list reviewed and updated    Medical and surgical history reviewed    Prenatal labs reviewed and updated    Physical Exam:  ABD: soft, gravid, nontender    Assessment:  Stephanie Davalos 43 y.o.  who presents for establishment of care. Requested records from OB/GYN.     Plan:   1. Routine prenatal  - Records requested from OB/GYN in [place]  - Patient instructed in follow up 1 week after records evaluated     2. H/o fetus with Downs syndrome and VSD  - Will need to schedule US at 32 weeks  - Evaluation with pediatric cardiology    3. Type 2 DM  - Order HbA1c, P/C ratio, CMP at next visit  - Will put in referral for MFM, pending transfer of  records  - EKG, pending transfer of records  - Will coordinate podiatry and ophthalmology consult at next visit  - Will instruct patient to keep blood glucose log and bring next week    Follow up 1 Week, kick counts, labor precautions

## 2018-08-21 NOTE — PROGRESS NOTES
Stephanie was seen today for routine prenatal visit.    Diagnoses and all orders for this visit:    Elderly multigravida in second trimester    Type 2 diabetes mellitus without complication, with long-term current use of insulin    Down syndrome in child of prior pregnancy, currently pregnant  -     Ambulatory consult to Maternal Fetal Medicine  -     US MFM Procedure (Viewpoint); Future    H/O  section    H/O congenital cardiac septal defect  -     Ambulatory consult to Maternal Fetal Medicine  -     US MFM Procedure (Viewpoint); Future    Obesity, unspecified classification, unspecified obesity type, unspecified whether serious comorbidity present    Obesity affecting pregnancy in second trimester    Insulin controlled gestational diabetes mellitus (GDM) in second trimester      Does not have bs log, nor do we have prenatal records.  Records requested.  RTC 1 week.

## 2018-08-28 ENCOUNTER — PROCEDURE VISIT (OUTPATIENT)
Dept: MATERNAL FETAL MEDICINE | Facility: CLINIC | Age: 43
End: 2018-08-28
Payer: MEDICAID

## 2018-08-28 ENCOUNTER — INITIAL CONSULT (OUTPATIENT)
Dept: MATERNAL FETAL MEDICINE | Facility: CLINIC | Age: 43
End: 2018-08-28
Payer: MEDICAID

## 2018-08-28 ENCOUNTER — TELEPHONE (OUTPATIENT)
Dept: PEDIATRIC CARDIOLOGY | Facility: CLINIC | Age: 43
End: 2018-08-28

## 2018-08-28 ENCOUNTER — ROUTINE PRENATAL (OUTPATIENT)
Dept: OBSTETRICS AND GYNECOLOGY | Facility: CLINIC | Age: 43
End: 2018-08-28
Payer: MEDICAID

## 2018-08-28 VITALS
SYSTOLIC BLOOD PRESSURE: 116 MMHG | WEIGHT: 256.63 LBS | DIASTOLIC BLOOD PRESSURE: 64 MMHG | BODY MASS INDEX: 36.82 KG/M2

## 2018-08-28 VITALS — WEIGHT: 255.5 LBS | SYSTOLIC BLOOD PRESSURE: 104 MMHG | DIASTOLIC BLOOD PRESSURE: 64 MMHG | BODY MASS INDEX: 36.66 KG/M2

## 2018-08-28 DIAGNOSIS — O09.299 DOWN SYNDROME IN CHILD OF PRIOR PREGNANCY, CURRENTLY PREGNANT: ICD-10-CM

## 2018-08-28 DIAGNOSIS — Z34.82 ENCOUNTER FOR SUPERVISION OF OTHER NORMAL PREGNANCY IN SECOND TRIMESTER: ICD-10-CM

## 2018-08-28 DIAGNOSIS — O99.212 OBESITY AFFECTING PREGNANCY IN SECOND TRIMESTER: ICD-10-CM

## 2018-08-28 DIAGNOSIS — O09.522 ELDERLY MULTIGRAVIDA IN SECOND TRIMESTER: ICD-10-CM

## 2018-08-28 DIAGNOSIS — Z82.79 FAMILY HISTORY OF CONGENITAL OR GENETIC CONDITION: ICD-10-CM

## 2018-08-28 DIAGNOSIS — Z87.74 H/O CONGENITAL CARDIAC SEPTAL DEFECT: ICD-10-CM

## 2018-08-28 DIAGNOSIS — Z3A.25 25 WEEKS GESTATION OF PREGNANCY: Primary | ICD-10-CM

## 2018-08-28 DIAGNOSIS — O24.112 PRE-EXISTING TYPE 2 DIABETES MELLITUS DURING PREGNANCY IN SECOND TRIMESTER: Primary | ICD-10-CM

## 2018-08-28 DIAGNOSIS — O24.912 DIABETES MELLITUS COMPLICATING PREGNANCY, SECOND TRIMESTER: Primary | ICD-10-CM

## 2018-08-28 PROCEDURE — 99213 OFFICE O/P EST LOW 20 MIN: CPT | Mod: TH,S$PBB,, | Performed by: ADVANCED PRACTICE MIDWIFE

## 2018-08-28 PROCEDURE — 99999 PR PBB SHADOW E&M-EST. PATIENT-LVL II: CPT | Mod: PBBFAC,,, | Performed by: ADVANCED PRACTICE MIDWIFE

## 2018-08-28 PROCEDURE — 76811 OB US DETAILED SNGL FETUS: CPT | Mod: PBBFAC | Performed by: OBSTETRICS & GYNECOLOGY

## 2018-08-28 PROCEDURE — 83036 HEMOGLOBIN GLYCOSYLATED A1C: CPT

## 2018-08-28 PROCEDURE — 76811 OB US DETAILED SNGL FETUS: CPT | Mod: 26,S$PBB,, | Performed by: OBSTETRICS & GYNECOLOGY

## 2018-08-28 PROCEDURE — 99212 OFFICE O/P EST SF 10 MIN: CPT | Mod: PBBFAC,TH,PO,25 | Performed by: ADVANCED PRACTICE MIDWIFE

## 2018-08-28 PROCEDURE — 80053 COMPREHEN METABOLIC PANEL: CPT

## 2018-08-28 PROCEDURE — 99214 OFFICE O/P EST MOD 30 MIN: CPT | Mod: S$PBB,TH,25, | Performed by: OBSTETRICS & GYNECOLOGY

## 2018-08-28 RX ORDER — MULTI-VITAMIN/MINERAL SUPPLEMENT WITH SODIUM ASCORBATE, CHOLECALCIFEROL, DI-ALPHA-TOCOPHERYL ACETATE, THIAMINE MONONITRATE, RIBOFLAVIN, NIACINAMIDE, PYRIDOXINE HCL, FOLIC ACID, CYANOCOBALAMIN, CALCIUM FORMATE, CALCIUM CARBONATE, FERROUS (II) BIS-GLYCINATE CHELATE, POTASSIUM IODIDE, ZINC OXIDE, AND CHOLINE BITARTRATE 50; 155; 45; 32; 55; 30; 3; 1; 20; 10; 100; 10; 120; 3; 450 MG/1; MG/1; MG/1; MG/1; MG/1; [IU]/1; MG/1; MG/1; MG/1; UG/1; UG/1; MG/1; MG/1; MG/1; [IU]/1
TABLET, FILM COATED ORAL
COMMUNITY
Start: 2018-08-24 | End: 2018-12-18

## 2018-08-28 NOTE — PROGRESS NOTES
Subjective:       Patient ID: Stephanie Russell is a 43 y.o. female  8 para 3223 currently at 25 weeks and 6 days who is a late transfer of care.    Chief Complaint:  Pre gestational diabetes, advanced maternal age, obesity    1) Type 2 Diabetes:  Long history of pre gestational diabetes.  She did not bring a blood sugar log today.  She is currently taking Novolin and 54 units before breakfast and 10 units of regular insulin before each meal.  She reports that her fastings are all generally less than 95 but her 2 hr post prandials, when she checks them, her often in the 140 to 150 range.  She has no history of end organ damage from her diabetes.  There are no baseline labs for evaluation at this point.    2) Prior Child With Trisomy 21 And VSD:  Her son was born in 2017 and he has a diagnosis of trisomy 21.  He had of ventricular septal defect as well. She declined serum screening for chromosome abnormalities earlier in this pregnancy but she did have an AFP for neural tube defects that was normal. We discussed the increased risk for trisomy 21 of approximately 2% given her age and her prior affected child.    3) Prior Fetal Demise x2:  Patient is not a good historian for exactly what happened in the prior pregnancies but she reports that in 1 of the prior pregnancy she had an umbilical cord accident and in the other it is unclear of the etiology but she had a fetal demise in  of a male fetus that weighed 14 oz.  No particular etiology was ever determined.    4) Prior  Delivery x3:  Her most recent  was in  and was a classical incision.  Given that she will need a repeat  at around 37 weeks.        OB History    Para Term  AB Living   8 4 3 1 3 3   SAB TAB Ectopic Multiple Live Births   2   1 0 3      # Outcome Date GA Lbr Nikhil/2nd Weight Sex Delivery Anes PTL Lv   8 Current            7 SAB 2017           6 Term 17 38w0d  3.714 kg (8 lb 3  oz) M CS-Classical Spinal N MITCHELL   5     0.4 kg (14.1 oz) U   Y FD   4     0.5 kg (1 lb 1.6 oz) U   Y FD      Complications: Nuchal cord   3 Term    3.175 kg (7 lb) F CS-LTranv  N MITCHELL   2 Ectopic 2001           1 Term    4.649 kg (10 lb 4 oz) M CS-Unspec  N MITCHELL      Complications: Failure to progress in labor           Past Surgical History:   Procedure Laterality Date     SECTION      ECTOPIC PREGNANCY SURGERY       For social history is notable for former smoker with    Meds: insulin, low dose aspirin  Family history is notable for her son with trisomy 21 and a septal defect, otherwise negative    A targeted ultrasound evaluation was performed today.  The findings are detailed on the report.  Overall the study was suboptimal due to the fetal position in the maternal habitus.  We did not identify any gross structural abnormalities but views, especially those of the cardiac anatomy were limited.  We discussed limitations of ultrasound in diagnosing all congenital abnormalities, especially with regard to diagnosis of chromosome abnormalities. The amniotic fluid volume was normal and the cervical length appeared normal.       Assessment/Recommendations:   1) Diabetes    Today the patient was counseled on the risks of diabetes in pregnancy. I reviewed the physiologic effects of pregnancy on diabetes and I stressed with the patient the need for meticulous glucose control. I discussed with the patient the increased risks of fetal structural anomalies, macrosomia, prematurity, shoulder dystocia,  hyperbilirubinemia, and sudden stillbirth in those patients with poor glucose control. I also discussed with the patient the need for increased fetal surveillance with monthly fetal growth assessments and third trimester fetal testing. I also reviewed the possibility of need for early delivery in those with poor glucose control or evidence of fetal growth abnormalities.    For  patients with pre-gestational diabetes in pregnancy our MFM groups recommends the followin. Insulin should be used to keep fasting glucose levels <95 mg/dL and 2 hour postprandial levels <120 mg/dL.  I instructed her on checking her blood glucose measurements at least 4 times a day and recording them on a log.  2. A fetal echocardiogram should be performed at approximately 20-24 weeks EGA to rule out congenital heart disease.   3. Periodic fetal growth assessments should be performed every 4 weeks to assess for fetal macrosomia.   4.  testing should be instituted beginning at 32 weeks.   5. One of the most important determinants of  outcome in pre-gestational diabetics is the presence or absence of vascular disease. The earliest signs of diabetic-related vascular disease are proteinuria (nephropathy) and retinal capillary microaneurysms (retinopathy). A urinary protein/creatinine ratio or 24-hour urine is recommended to look for proteinuria and an ophthalmology examination, performed by a specialist skilled in diseases of the retina, is recommended.   6. A hemoglobin A1c is recommended to assess long-term control and reassessment each trimester is recommended.  6. Secondary to the high incidence of preeclampsia in patients with diabetes predating pregnancy, we recommend a baseline assessment of renal function with a serum creatinine.   6. Given her age and risk factors, she needs an EKG to look for occult cardiovascular disease. An echocardiogram is also recommended due to her obesity, likely sleep apnea (she was snoring in US room), and maternal age. If she has sleep disordered breathing she is at increased risk for maternal complications.  7. Delivery is indicated at 36-37 weeks due to her prior classical.    2) Prior child with trisomy 21    She has declined screening previously.  We discussed the limitations of ultrasound in diagnosing chromosome problems.  At this point there is no  reason for any alteration in usual obstetric care based on this history.    3) prior  x3 with a classical incision:    Plan repeat at 36 to 37 weeks.  There is no evidence of an accreta or other placentation abnormality on ultrasound today.    4) Advanced Maternal Age    We discussed the increased risk of advanced maternal age including growth restriction, stillbirth, and preeclampsia.  She is currently taking low-dose aspirin and I encouraged her to continue.  Increased surveillance and timing of delivery will already be affected by her other comorbidities.    5) PRIOR FETAL DEMISE X2    There is an unclear etiology for least 1 of these 2 prior losses.  It is possible that they could have been related to chromosome abnormalities but we do not have definitive information at this point.  In addition, a could have been related to her diabetes.  Regardless, at this point she will have  testing beginning at 32 weeks and the plan for delivery will be for around 36 to 37 weeks.    We will schedule her for a repeat ultrasound evaluation in approximately 3 weeks to hopefully complete the anatomy and a fetal echocardiogram around that time as well.    Total face-to-face time approximately 30 min greater than 50% was in counseling and further care coordination.    Froylan Gonzalez Jr., MD  Maternal Fetal Medicine

## 2018-08-28 NOTE — LETTER
August 28, 2018      Lissa Nicholas MD  [address]  [address]  [place] [address]           [facility]  [address]  [place] [address]  Phone: [phone]          Patient: Stephanie Russell   MR Number: [id number]   YOB: 1975   Date of Visit: 8/28/2018       Dear Dr. Lissa Nicholas:    Thank you for referring Stephanie Russell to me for evaluation. Attached you will find relevant portions of my assessment and plan of care.    If you have questions, please do not hesitate to call me. I look forward to following Stephanie Russell along with you.    Sincerely,    Froylan Gonzalez MD    Enclosure  CC:  No Recipients    If you would like to receive this communication electronically, please contact [email or web address] or [phone] to request more information on [employer] access.    For providers and/or their staff who would like to refer a patient to [facility], please contact us through our one-stop-shop provider referral line, [facility], at [phone].    If you feel you have received this communication in error or would no longer like to receive these types of communications, please e-mail [email or web address]

## 2018-08-28 NOTE — PROGRESS NOTES
Chief Complaint   Patient presents with    Routine Prenatal Visit       43 y.o., at 25w6d by Estimated Date of Delivery: 18    Complaints today: No complaints- had MFM brenda scan today    ROS  OBSTETRICS:   Contractions denies   Bleeding denies   Loss of fluid denies   Fetal mvmnt Reports good FM  GASTRO:   Nausea denies   Vomiting denies      OB History    Para Term  AB Living   8 4 3 1 3 3   SAB TAB Ectopic Multiple Live Births   2   1 0 3      # Outcome Date GA Lbr Nikhil/2nd Weight Sex Delivery Anes PTL Lv   8 Current            7 SAB 2017           6 Term 17 38w0d  3.714 kg (8 lb 3 oz) M CS-Classical Spinal N MITCHELL   5 SAB    0.4 kg (14.1 oz) U   Y FD   4     0.5 kg (1 lb 1.6 oz) U   Y FD      Complications: Nuchal cord   3 Term    3.175 kg (7 lb) F CS-LTranv      2 Ectopic            1 Term    4.649 kg (10 lb 4 oz) M CS-Unspec  N MITCHELL      Complications: Failure to progress in labor          Dating reviewed  Allergies and problem list reviewed and updated  Medical and surgical history reviewed  Prenatal labs reviewed and updated    PHYSICAL EXAM  /64   Wt 116.4 kg (256 lb 9.9 oz)   LMP 2018   BMI 36.82 kg/m²     GENERAL: No acute distress  NEURO: Alert and oriented x3  PSYCH: Normal mood and affect  PULMONARY: Non-labored respiration  ABDomen: Soft, gravid, nontender    ASSESSMENT AND PLAN    [id number] Problems (from 18 to present)     Problem Noted Resolved    AMA (advanced maternal age) multigravida 35+ - needs u/s at 32 weeks 2018 by Nica Moody MD No    Down syndrome in child of prior pregnancy, currently pregnant 2018 by Nica Moody MD No    H/O  section - classical, needs repeat 36-37 weeks 2018 by Nica Moody MD No    H/O congenital cardiac septal defect - peds card consult 2018 by Nica Moody MD No    Obesity 2018 by Nica Moody MD No    Insulin controlled gestational diabetes  mellitus (GDM) during pregnancy 2018 by Lissa Nicholas MD No    Overview Signed 2018 12:09 PM by Lissa Nicholas MD     ekg  P/c  cmp  Eye  Podiatry  Hem a1c  pnt         Obesity complicating pregnancy 2018 by Lissa Nicholas MD No    Overview Addendum 2018 12:09 PM by Lissa Nicholas MD     Serial growth               DID NOT BRING GLUCOSE LOG- discussed need for glucose review- discussed risks to self and fetus  Records reviewed- labs incomplete- draw labs at next visit  Jackie scan done 18- note not yet available   labor precautions given  Follow-up: 2 weeks

## 2018-08-28 NOTE — TELEPHONE ENCOUNTER
----- Message from Claude Hill RN sent at 8/28/2018 10:47 AM CDT -----  Please schedule patient for fetal echo - Type 2 DM, Has child with heart defect and Down Syndrome.    Thanks!    Claude

## 2018-09-11 ENCOUNTER — ROUTINE PRENATAL (OUTPATIENT)
Dept: OBSTETRICS AND GYNECOLOGY | Facility: CLINIC | Age: 43
End: 2018-09-11
Payer: MEDICAID

## 2018-09-11 VITALS — BODY MASS INDEX: 37.07 KG/M2 | WEIGHT: 258.38 LBS

## 2018-09-11 DIAGNOSIS — O24.414 INSULIN CONTROLLED GESTATIONAL DIABETES MELLITUS (GDM) IN SECOND TRIMESTER: ICD-10-CM

## 2018-09-11 DIAGNOSIS — Z3A.27 27 WEEKS GESTATION OF PREGNANCY: Primary | ICD-10-CM

## 2018-09-11 LAB
ALBUMIN SERPL BCP-MCNC: 2.6 G/DL
ALP SERPL-CCNC: 58 U/L
ALT SERPL W/O P-5'-P-CCNC: 14 U/L
ANION GAP SERPL CALC-SCNC: 6 MMOL/L
AST SERPL-CCNC: 17 U/L
BASOPHILS # BLD AUTO: 0.03 K/UL
BASOPHILS NFR BLD: 0.4 %
BILIRUB SERPL-MCNC: 0.2 MG/DL
BUN SERPL-MCNC: 9 MG/DL
CALCIUM SERPL-MCNC: 8.9 MG/DL
CHLORIDE SERPL-SCNC: 106 MMOL/L
CO2 SERPL-SCNC: 21 MMOL/L
CREAT SERPL-MCNC: 0.6 MG/DL
DIFFERENTIAL METHOD: ABNORMAL
EOSINOPHIL # BLD AUTO: 0.1 K/UL
EOSINOPHIL NFR BLD: 1.8 %
ERYTHROCYTE [DISTWIDTH] IN BLOOD BY AUTOMATED COUNT: 14.4 %
EST. GFR  (AFRICAN AMERICAN): >60 ML/MIN/1.73 M^2
EST. GFR  (NON AFRICAN AMERICAN): >60 ML/MIN/1.73 M^2
ESTIMATED AVG GLUCOSE: 163 MG/DL
GLUCOSE SERPL-MCNC: 183 MG/DL
HBA1C MFR BLD HPLC: 7.3 %
HCT VFR BLD AUTO: 38.9 %
HGB BLD-MCNC: 12.5 G/DL
IMM GRANULOCYTES # BLD AUTO: 0.1 K/UL
IMM GRANULOCYTES NFR BLD AUTO: 1.3 %
LYMPHOCYTES # BLD AUTO: 1.8 K/UL
LYMPHOCYTES NFR BLD: 22.5 %
MCH RBC QN AUTO: 27.5 PG
MCHC RBC AUTO-ENTMCNC: 32.1 G/DL
MCV RBC AUTO: 86 FL
MONOCYTES # BLD AUTO: 0.7 K/UL
MONOCYTES NFR BLD: 9.2 %
NEUTROPHILS # BLD AUTO: 5.1 K/UL
NEUTROPHILS NFR BLD: 64.8 %
NRBC BLD-RTO: 0 /100 WBC
PLATELET # BLD AUTO: 257 K/UL
PMV BLD AUTO: 10.4 FL
POTASSIUM SERPL-SCNC: 3.7 MMOL/L
PROT SERPL-MCNC: 6.8 G/DL
RBC # BLD AUTO: 4.55 M/UL
SODIUM SERPL-SCNC: 133 MMOL/L
WBC # BLD AUTO: 7.81 K/UL

## 2018-09-11 PROCEDURE — 99999 PR PBB SHADOW E&M-EST. PATIENT-LVL II: CPT | Mod: PBBFAC,,, | Performed by: ADVANCED PRACTICE MIDWIFE

## 2018-09-11 PROCEDURE — 84156 ASSAY OF PROTEIN URINE: CPT

## 2018-09-11 PROCEDURE — 99212 OFFICE O/P EST SF 10 MIN: CPT | Mod: PBBFAC,TH,PO | Performed by: ADVANCED PRACTICE MIDWIFE

## 2018-09-11 PROCEDURE — 85025 COMPLETE CBC W/AUTO DIFF WBC: CPT

## 2018-09-11 PROCEDURE — 99213 OFFICE O/P EST LOW 20 MIN: CPT | Mod: TH,S$PBB,, | Performed by: ADVANCED PRACTICE MIDWIFE

## 2018-09-11 NOTE — PROGRESS NOTES
Chief Complaint   Patient presents with    Routine Prenatal Visit       43 y.o., at 27w6d by Estimated Date of Delivery: 18    Complaints today: None    ROS  OBSTETRICS:   Contractions denies   Bleeding denies   Loss of fluid denies   Fetal mvmnt Reports good FM, reinforced BID  GASTRO:   Nausea none   Vomiting none      OB History    Para Term  AB Living   8 4 3 1 3 3   SAB TAB Ectopic Multiple Live Births   2   1 0 3      # Outcome Date GA Lbr Nikhil/2nd Weight Sex Delivery Anes PTL Lv   8 Current            7 SAB 2017           6 Term 17 38w0d  3.714 kg (8 lb 3 oz) M CS-Classical Spinal N MITCHELL   5 SAB    0.4 kg (14.1 oz) U   Y FD   4     0.5 kg (1 lb 1.6 oz) U   Y FD      Complications: Nuchal cord   3 Term    3.175 kg (7 lb) F CS-LTranv      2 Ectopic            1 Term    4.649 kg (10 lb 4 oz) M CS-Unspec  N MITCHELL      Complications: Failure to progress in labor          Dating reviewed  Allergies and problem list reviewed and updated  Medical and surgical history reviewed  Prenatal labs reviewed and updated    PHYSICAL EXAM  Wt 117.2 kg (258 lb 6.1 oz)   LMP 2018   BMI 37.07 kg/m²     GENERAL: No acute distress  NEURO: Alert and oriented x3  PSYCH: Normal mood and affect  PULMONARY: Non-labored respiration  ABDomen: Soft, gravid, nontender    ASSESSMENT AND PLAN    [id number] Problems (from 18 to present)     Problem Noted Resolved    AMA (advanced maternal age) multigravida 35+ - needs u/s at 32 weeks 2018 by Nica Moody MD No    Down syndrome in child of prior pregnancy, currently pregnant 2018 by Nica Moody MD No    H/O  section - classical, needs repeat 36-37 weeks 2018 by Nica Moody MD No    H/O congenital cardiac septal defect - peds card consult 2018 by Nica Moody MD No    Obesity 2018 by Nica Moody MD No    Insulin controlled gestational diabetes mellitus (GDM) during pregnancy  2018 by Lissa Nicholas MD No    Overview Signed 2018 12:09 PM by Lissa Nicholas MD     ekg  P/c- 18  cmp- 18  Eye  Podiatry  Hem a1c- 18  pnt         Obesity complicating pregnancy 2018 by Lissa Nicholas MD No    Overview Addendum 2018 12:09 PM by Lissa Nicholas MD     Serial growth           Glucose record reviewed with DR. Jim- record scanned into chart. Discussed with pt recommendation to take her insulin 30 min prior to meals and NOT 30 min p meals. Also advsed recommendation per Dr. Gagnon to add 10units NPH at 9PM. Pt reports that she feels this will bottom her out and declines the night time dose.  Pt has f/u appt with Dr. Gonzalez on 18  Pt has fetal echo scheduled 18       labor precautions given  Follow-up: 2 weeks

## 2018-09-12 LAB
CREAT UR-MCNC: 107 MG/DL
PROT UR-MCNC: 11 MG/DL
PROT/CREAT UR: 0.1 MG/G{CREAT}

## 2018-09-12 NOTE — PROGRESS NOTES
Chief Complaint   Patient presents with    Routine Prenatal Visit                     OB History    Para Term  AB Living   8 4 3 1 3 3   SAB TAB Ectopic Multiple Live Births   2   1 0 3      # Outcome Date GA Lbr Nikhil/2nd Weight Sex Delivery Anes PTL Lv   8 Current            7 SAB 2017           6 Term 17 38w0d  3.714 kg (8 lb 3 oz) M CS-Classical Spinal N MITCHELL   5 SAB    0.4 kg (14.1 oz) U   Y FD   4     0.5 kg (1 lb 1.6 oz) U   Y FD      Complications: Nuchal cord   3 Term    3.175 kg (7 lb) F CS-LTranv      2 Ectopic            1 Term    4.649 kg (10 lb 4 oz) M CS-Unspec  N MITCHELL      Complications: Failure to progress in labor          Dating reviewed  Allergies and problem list reviewed and updated  Medical and surgical history reviewed  Prenatal labs reviewed and updated    PHYSICAL EXAM  /64   Wt 116.4 kg (256 lb 9.9 oz)   LMP 2018   BMI 36.82 kg/m²     GENERAL: No acute distress  NEURO: Alert and oriented x3  PSYCH: Normal mood and affect  PULMONARY: Non-labored respiration  ABDomen: Soft, gravid, nontender    ASSESSMENT AND PLAN    gravida8 Problems (from 18 to present)     Problem Noted Resolved    AMA (advanced maternal age) multigravida 35+ - needs u/s at 32 weeks 2018 by Nica Moody MD No    Down syndrome in child of prior pregnancy, currently pregnant 2018 by Nica Moody MD No    H/O  section - classical, needs repeat 36-37 weeks 2018 by Nica Moody MD No    H/O congenital cardiac septal defect - peds card consult 2018 by Nica Moody MD No    Obesity 2018 by Nica Moody MD No    Insulin controlled gestational diabetes mellitus (GDM) during pregnancy 2018 by Lissa Nicholas MD No    Overview Signed 2018 12:09 PM by Lissa Nicholas MD     ekg  P/c  cmp  Eye  Podiatry  Hem a1c- 18= 7.3  pnt         Obesity complicating pregnancy 2018 by Lissa Nicholas MD No     Overview Addendum 8/21/2018 12:09 PM by Lissa Nicholas MD     Serial growth

## 2018-09-17 ENCOUNTER — PROCEDURE VISIT (OUTPATIENT)
Dept: MATERNAL FETAL MEDICINE | Facility: CLINIC | Age: 43
End: 2018-09-17
Payer: MEDICAID

## 2018-09-17 ENCOUNTER — INITIAL CONSULT (OUTPATIENT)
Dept: MATERNAL FETAL MEDICINE | Facility: CLINIC | Age: 43
End: 2018-09-17
Payer: MEDICAID

## 2018-09-17 VITALS — BODY MASS INDEX: 37.45 KG/M2 | SYSTOLIC BLOOD PRESSURE: 124 MMHG | DIASTOLIC BLOOD PRESSURE: 86 MMHG | WEIGHT: 261 LBS

## 2018-09-17 DIAGNOSIS — O09.299 DOWN SYNDROME IN CHILD OF PRIOR PREGNANCY, CURRENTLY PREGNANT: ICD-10-CM

## 2018-09-17 DIAGNOSIS — Z87.74 H/O CONGENITAL CARDIAC SEPTAL DEFECT: ICD-10-CM

## 2018-09-17 DIAGNOSIS — O09.523 ADVANCED MATERNAL AGE IN MULTIGRAVIDA, THIRD TRIMESTER: ICD-10-CM

## 2018-09-17 DIAGNOSIS — O24.913 DIABETES MELLITUS COMPLICATING PREGNANCY, THIRD TRIMESTER: Primary | ICD-10-CM

## 2018-09-17 DIAGNOSIS — O24.112 PRE-EXISTING TYPE 2 DIABETES MELLITUS DURING PREGNANCY IN SECOND TRIMESTER: ICD-10-CM

## 2018-09-17 DIAGNOSIS — Z36.89 ENCOUNTER FOR ULTRASOUND TO CHECK FETAL GROWTH: ICD-10-CM

## 2018-09-17 PROCEDURE — 99211 OFF/OP EST MAY X REQ PHY/QHP: CPT | Mod: PBBFAC,TH | Performed by: OBSTETRICS & GYNECOLOGY

## 2018-09-17 PROCEDURE — 76816 OB US FOLLOW-UP PER FETUS: CPT | Mod: 26,S$PBB,, | Performed by: OBSTETRICS & GYNECOLOGY

## 2018-09-17 PROCEDURE — 99213 OFFICE O/P EST LOW 20 MIN: CPT | Mod: S$PBB,TH,25, | Performed by: OBSTETRICS & GYNECOLOGY

## 2018-09-17 PROCEDURE — 99999 PR PBB SHADOW E&M-EST. PATIENT-LVL I: CPT | Mod: PBBFAC,,, | Performed by: OBSTETRICS & GYNECOLOGY

## 2018-09-17 PROCEDURE — 76816 OB US FOLLOW-UP PER FETUS: CPT | Mod: PBBFAC | Performed by: OBSTETRICS & GYNECOLOGY

## 2018-09-17 NOTE — PROGRESS NOTES
"Indication  ========    blood sugar check; follow-up evaluation of anatomy and growth.    History  ======    Risk Factors  Details: previous pregnancy with Down Syndrome  Details: AMA  Details: obesity    Pregnancy History  ==============    Maternal Lab Tests  Test: Cell free fetal DNA analysis  Result:  negative ([id number])    Wants to know gender: yes    Maternal Assessment  =================    Weight 118 kg  Weight (lb) 260 lb  BP syst 124 mmHg  BP diast 86 mmHg    Method  ======    Transabdominal ultrasound examination, Voluson E10. View: Good view.    Pregnancy  =========    Russell pregnancy. Number of fetuses: 1.    Dating  ======    GA by "stated dating" 28 w + 5 d  TUAN by "stated dating": 12/5/2018  "Stated dating" by: Doctor's office  Ultrasound examination on: 9/17/2018  GA by U/S based upon: AC, BPD, Femur, HC  GA by U/S 29 w + 2 d  TUAN by U/S: 12/1/2018  Assigned: Dating performed on 08/28/2018, based on the external assessment (by Doctor's office)  Assigned GA 28 w + 5 d  Assigned TUAN: 12/5/2018    General Evaluation  ==============    Cardiac activity: present.  bpm.  Fetal movements: visualized.  Presentation: transverse, head to maternal left.  Placenta:  Placental site: left lateral.  Umbilical cord: 3 vessel cord.  Amniotic fluid: normal amount.    Fetal Biometry  ============    Fetal Biometry  BPD 70.5 mm 28w 2d Hadlock  OFD 97.4 mm 31w 3d Gabby  .1 mm 29w 2d Hadlock  .3 mm 29w 3d Hadlock  Femur 57.6 mm 30w 1d Hadlock  EFW 1,417 g 54% Arsenio  Calculated by: Hadlock (BPD-HC-AC-FL)  EFW (lb) 3 lb  EFW (oz) 2 oz  Cephalic index 0.72  HC / AC 1.07  FL / BPD 0.82  FL / AC 0.23  MVP 7.2 cm   bpm  Head / Face / Neck  Nasal bone 11.2 mm    Fetal Anatomy  ===========    Cranium: normal  Profile: normal  4-chamber view: documented previously  RVOT: suboptimal  LVOT: suboptimal  Ductal arch: suboptimal  SVC: suboptimal  IVC: suboptimal  3-vessel-trachea " view: suboptimal  Rt lung: normal  Lt lung: normal  Stomach: normal  Kidneys: normal  Bladder: normal  Genitals: normal  Gender: male  Wants to know gender: yes    Maternal Structures  ===============    Ovaries / Tubes / Adnexa  Rt ovary: Visualized  Lt ovary: Visualized    Consultation  ==========    Stephanie returns today for follow-up visit. She is overall doing well and denies any significant problems. She reports good fetal movement. She is  taking low-dose aspirin for preeclampsia prophylaxis.    She brought in her log of her blood sugars and it was reviewed. Overall her fasting blood sugars look fairly good. She has some occasional 100  but then she also has some that her below 90. She thinks that the mornings where it is elevated is because she has some late night snacking.  We discussed trying to avoid this. Her 2 hr postprandial sugars are a little bit more elevated in the afternoon and after dinner while the post  breakfast are acceptable.    Her current regimen is Novolin 54 units with breakfast and 10 units of regular with each meal. I told her to CHANGE HER REGIMEN TO 58  UNITS OF NOVOLIN WITH BREAKFAST, CONTINUE 10 UNITS OF REGULAR WITH BREAKFAST, AND INCREASED TO 12 UNITS OF  REGULAR AT LUNCH AND DINNER. I told her that if she continues to have postprandial blood sugars greater than 140 following lunch and  dinner then she should increase to 14 units from 12.    Follow-up ultrasound was performed today fetal growth is at the 54th percentile. The amniotic fluid volume is normal. Cardiac views remains  suboptimal but we were able to get better views of the profile in some of the other anatomy. She is scheduled to have a fetal echocardiogram  tomorrow.    I overall spent approximately 15 minutes in face to face time with the patient and her family, greater than 50% of which was in counseling and  care coordination.    Impression  =========    The fetal anatomic survey was completed today, and no fetal  structural abnormalities were noted. Interval fetal growth has been normal, and the  AFV is normal. Cardiac views remain suboptimal.    Type 2 diabetes with fair glycemic control    Advanced maternal age and prior child with T21    Prior classical .    Recommendation  ==============    1. Fetal echo on   2. Change insulin regimen as noted above  3. Repeat ultrasound for fetal growth in approximately 3 to 4 weeks  4. Begin  surveillance at 32 weeks  5. Repeat  delivery at 36 to 37 weeks.

## 2018-09-17 NOTE — LETTER
September 17, 2018      Lissa Nicholas MD  [address]  [address]  [facility] [address]           [facility]  [address]  [facility] [address]  Phone: [phone]          Patient: Stephanie Russell   MR Number: [id number]   YOB: 1975   Date of Visit: 9/17/2018       Dear Dr. Lissa Nicholas:    Thank you for referring Stephanie Russell to me for evaluation. Attached you will find relevant portions of my assessment and plan of care.    If you have questions, please do not hesitate to call me. I look forward to following Stephanie Russell along with you.    Sincerely,    Billy Younger RN    Enclosure  CC:  No Recipients    If you would like to receive this communication electronically, please contact [email or web address] or [phone] to request more information on [employer] Link access.    For providers and/or their staff who would like to refer a patient to [facility], please contact us through our one-stop-shop provider referral line, [facility] , at [phone].    If you feel you have received this communication in error or would no longer like to receive these types of communications, please e-mail [email or web address]

## 2018-09-17 NOTE — NURSING
[facility] to [facility] for her follow up consult and ultrasound for Type 2 Diabetes and blood sugar check.    Patient's current insulin management includes:    Novolin:   - 54 units with Breakfast    Insulin Regular:  - 10 units with Breakfast  - 10 units with Lunch  - 10 units with Dinner    Copy of blood sugar log made for Dr. Gonzalez for his review.

## 2018-09-18 ENCOUNTER — CLINICAL SUPPORT (OUTPATIENT)
Dept: PEDIATRIC CARDIOLOGY | Facility: CLINIC | Age: 43
End: 2018-09-18
Payer: MEDICAID

## 2018-09-18 ENCOUNTER — OFFICE VISIT (OUTPATIENT)
Dept: PEDIATRIC CARDIOLOGY | Facility: CLINIC | Age: 43
End: 2018-09-18
Payer: MEDICAID

## 2018-09-18 VITALS
DIASTOLIC BLOOD PRESSURE: 59 MMHG | WEIGHT: 260.69 LBS | SYSTOLIC BLOOD PRESSURE: 118 MMHG | BODY MASS INDEX: 36.5 KG/M2 | HEIGHT: 71 IN

## 2018-09-18 DIAGNOSIS — O09.299 DOWN SYNDROME IN CHILD OF PRIOR PREGNANCY, CURRENTLY PREGNANT: ICD-10-CM

## 2018-09-18 DIAGNOSIS — Z87.74 H/O CONGENITAL CARDIAC SEPTAL DEFECT: Primary | ICD-10-CM

## 2018-09-18 DIAGNOSIS — O24.112 PRE-EXISTING TYPE 2 DIABETES MELLITUS DURING PREGNANCY IN SECOND TRIMESTER: ICD-10-CM

## 2018-09-18 DIAGNOSIS — E66.9 OBESITY, UNSPECIFIED CLASSIFICATION, UNSPECIFIED OBESITY TYPE, UNSPECIFIED WHETHER SERIOUS COMORBIDITY PRESENT: ICD-10-CM

## 2018-09-18 DIAGNOSIS — O24.414 INSULIN CONTROLLED GESTATIONAL DIABETES MELLITUS (GDM) IN THIRD TRIMESTER: ICD-10-CM

## 2018-09-18 PROCEDURE — 93325 DOPPLER ECHO COLOR FLOW MAPG: CPT | Mod: S$GLB,,, | Performed by: PEDIATRICS

## 2018-09-18 PROCEDURE — 76825 ECHO EXAM OF FETAL HEART: CPT | Mod: S$GLB,,, | Performed by: PEDIATRICS

## 2018-09-18 PROCEDURE — 76827 ECHO EXAM OF FETAL HEART: CPT | Mod: S$GLB,,, | Performed by: PEDIATRICS

## 2018-09-18 PROCEDURE — 99213 OFFICE O/P EST LOW 20 MIN: CPT | Mod: 25,S$GLB,, | Performed by: PEDIATRICS

## 2018-09-19 NOTE — PROGRESS NOTES
"Ms. Russell  is a 43 y.o. year old  , referred by Dr. Gonzalez because of diabetes mellitus and a previous child with Down syndrome and congenital heart disease.    The patient presented at approximately 28 6/7 weeks gestation (Patient's last menstrual period was 2018.).  The patient denied any complaints.    Past medical history: Significant for obesity and type II DM.  Past surgical history: S/P surgery for ectopic pregnancy and S/P C/S x 3.  Past gestational history: The patient has two healthy children.  The youngest child has Down syndrome.  He is S/P repair of a complete AV canal defect.  She also has a history of one ectopic pregnancy, two second trimester IUFD's and one spontaneous .    Family history: Negative for congenital heart disease, and sudden death during childhood.    Medications:   Outpatient Encounter Medications as of 2018   Medication Sig Dispense Refill    insulin NPH (NOVOLIN N) 100 unit/mL injection Inject 60 Units into the skin before breakfast.       insulin regular 100 unit/mL Inj injection Inject 10 Units into the skin 3 (three) times daily before meals.       NESTABS 32-1,000 mg-mcg Tab        No facility-administered encounter medications on file as of 2018.        Allergies: Patient has no known allergies.    Blood pressure (!) 118/59, height 5' 11" (1.803 m), weight 118.2 kg (260 lb 11.1 oz), last menstrual period 2018, currently breastfeeding.    Fetal echocardiogram was technically difficult due to patient size and fetal position.  A four chamber fetal heart with situs solitus was seen.  The ventricles appeared to be equal in size.  The contractility of both ventricles was good.  The fetal heart rate was within the normal range, and regular.  The interventricular septum appeared to be intact.  There were normally related great arteries seen.  The ductal and aortic arch were visualized, and appeared to be widely patent.  There was no pleural or " pericardial effusion seen.    Doppler analysis revealed a three vessel umbilical cord, with normal flow patterns, and velocities by Doppler.  There was a normal flow pattern seen in the ductus venosus.  There was evidence of normal systemic venous return seen.  The pulmonary venous return could not be visualized.  There were normal inflow patterns seen across the AV-valves, without significant insufficiency.  There was no ventricular level shunt seen.  The right and left ventricular outflow tract, and ductal and aortic arch appeared to be unobstructed.    Impression:  Although this was a technically difficult study, it is our impression that Ms. Russell had a normal fetal echocardiogram.  As you know, small defects, and coarctation of the aorta cannot always be ruled out on fetal echocardiogram.  We discussed our findings with the patient and her partner, reviewed our images, and answered their questions. We also discussed the limitations of fetal echocardiography, but emphasized that their child appears to have normal cardiac anatomy.  No further follow up is scheduled in our clinic, but, of course, we will always be available to reevaluate this patient, if needed.    The above information was discussed in detail including the use of diagrams, with 30 minutes of total face to face time, with greater than 50% with counseling and coordination of care.  The discussion of the diagnosis and treatment options is as described above.      Time spent: 30 minutes, 50% dedicated to counseling.

## 2018-09-25 ENCOUNTER — ROUTINE PRENATAL (OUTPATIENT)
Dept: OBSTETRICS AND GYNECOLOGY | Facility: CLINIC | Age: 43
End: 2018-09-25
Payer: MEDICAID

## 2018-09-25 VITALS — SYSTOLIC BLOOD PRESSURE: 120 MMHG | BODY MASS INDEX: 36.1 KG/M2 | DIASTOLIC BLOOD PRESSURE: 70 MMHG | WEIGHT: 258.81 LBS

## 2018-09-25 DIAGNOSIS — Z98.891 H/O CESAREAN SECTION: ICD-10-CM

## 2018-09-25 DIAGNOSIS — O99.213 OBESITY AFFECTING PREGNANCY IN THIRD TRIMESTER: ICD-10-CM

## 2018-09-25 DIAGNOSIS — O09.299 DOWN SYNDROME IN CHILD OF PRIOR PREGNANCY, CURRENTLY PREGNANT: ICD-10-CM

## 2018-09-25 DIAGNOSIS — O24.414 INSULIN CONTROLLED GESTATIONAL DIABETES MELLITUS (GDM) IN THIRD TRIMESTER: ICD-10-CM

## 2018-09-25 DIAGNOSIS — Z87.74 H/O CONGENITAL CARDIAC SEPTAL DEFECT: ICD-10-CM

## 2018-09-25 DIAGNOSIS — O09.523 ELDERLY MULTIGRAVIDA IN THIRD TRIMESTER: Primary | ICD-10-CM

## 2018-09-25 PROBLEM — F33.0 MILD RECURRENT MAJOR DEPRESSION: Status: ACTIVE | Noted: 2018-09-25

## 2018-09-25 LAB
ABO + RH BLD: NORMAL
BLD GP AB SCN CELLS X3 SERPL QL: NORMAL

## 2018-09-25 PROCEDURE — 86703 HIV-1/HIV-2 1 RESULT ANTBDY: CPT

## 2018-09-25 PROCEDURE — 99213 OFFICE O/P EST LOW 20 MIN: CPT | Mod: TH,S$PBB,, | Performed by: OBSTETRICS & GYNECOLOGY

## 2018-09-25 PROCEDURE — 99213 OFFICE O/P EST LOW 20 MIN: CPT | Mod: PBBFAC,TH,PO,25 | Performed by: OBSTETRICS & GYNECOLOGY

## 2018-09-25 PROCEDURE — 86762 RUBELLA ANTIBODY: CPT

## 2018-09-25 PROCEDURE — 87086 URINE CULTURE/COLONY COUNT: CPT

## 2018-09-25 PROCEDURE — 86901 BLOOD TYPING SEROLOGIC RH(D): CPT

## 2018-09-25 PROCEDURE — 83021 HEMOGLOBIN CHROMOTOGRAPHY: CPT

## 2018-09-25 PROCEDURE — 86592 SYPHILIS TEST NON-TREP QUAL: CPT

## 2018-09-25 PROCEDURE — 87340 HEPATITIS B SURFACE AG IA: CPT

## 2018-09-25 PROCEDURE — 99999 PR PBB SHADOW E&M-EST. PATIENT-LVL III: CPT | Mod: PBBFAC,,, | Performed by: OBSTETRICS & GYNECOLOGY

## 2018-09-25 NOTE — PROGRESS NOTES
Complaints today: none    Pt denies N/V, VB, LOF, ctx. Reports normal FM. Pt's current insulin regimen is Novolin 58 units every morning, regular 12 units with breakfast, lunch, dinner. Pt states she does not typically take insulin regularly. Always takes morning dose of Novolin but doesn't remember to take lunch/dinner. Has glucose log today. Fasting 90s-100s. Post prandial breakfast 110s-140s. Post prandial lunch/dinner 140-180s. Pt's insulin regimen was changed at last [facility] visit from 10 units of regular insulin with meals to 12 units. Pt states she is taking 12 units when she remembers and sometimes will cover herself with 14 units when post prandial is >140.    /70   Wt 117.4 kg (258 lb 13.1 oz)   LMP 2018   BMI 36.10 kg/m²     43 y.o., at 29w6d by Estimated Date of Delivery: 18  Patient Active Problem List   Diagnosis    AMA (advanced maternal age) multigravida 35+ - needs u/s at 32 weeks    Type 2 diabetes mellitus    Down syndrome in child of prior pregnancy, currently pregnant    H/O  section - classical, needs repeat 36-37 weeks    H/O congenital cardiac septal defect - peds card consult    Obesity    Insulin controlled gestational diabetes mellitus (GDM) during pregnancy    Obesity complicating pregnancy     OB History    Para Term  AB Living   8 4 3 1 3 3   SAB TAB Ectopic Multiple Live Births   2   1 0 3      # Outcome Date GA Lbr Nikhil/2nd Weight Sex Delivery Anes PTL Lv   8 Current            7 SAB 2017           6 Term 17 38w0d  3.714 kg (8 lb 3 oz) M CS-Classical Spinal N MITCHELL   5 SAB    0.4 kg (14.1 oz) U   Y FD   4     0.5 kg (1 lb 1.6 oz) U   Y FD      Complications: Nuchal cord   3 Term    3.175 kg (7 lb) F CS-LTranv      2 Ectopic 2001           1 Term    4.649 kg (10 lb 4 oz) M CS-Unspec  N MITCHELL      Complications: Failure to progress in labor          Dating reviewed    Allergies and problem list reviewed and  updated    Medical and surgical history reviewed    Prenatal labs reviewed and updated    Physical Exam:  ABD: soft, gravid, nontender,     Assessment:  Pt is a 43 y.o.  who presents for routine OB follow up    Plan:   1. H/O  section - classical, needs repeat 36-37 weeks      2. Insulin controlled gestational diabetes mellitus (GDM) in third trimester  - counseled pt on importance of taking insulin regularly  - will start PNT at 32 weeks  - repeat growth ordered    3. Elderly multigravida in third trimester  - Rubella antibody, IgG  - RPR  - HIV-1 and HIV-2 antibodies  - Hemoglobin Electrophoresis,Hgb A2 Hasmukh.  - Urine culture  - Hepatitis B surface antigen  - Type & Screen - Ob Profile    4. Down syndrome in child of prior pregnancy, currently pregnant    5. H/O congenital cardiac septal defect - peds card consult  - fetal echo normal    6. Obesity affecting pregnancy in third trimester       follow up 2 Weeks, kick counts, labor precautions       Deonna Umanzor MD   OBGYN, PGY-1

## 2018-09-26 LAB
BACTERIA UR CULT: NO GROWTH
HBV SURFACE AG SERPL QL IA: NEGATIVE
HGB A2 MFR BLD HPLC: 2.6 %
HGB FRACT BLD ELPH-IMP: NORMAL
HGB FRACT BLD ELPH-IMP: NORMAL
HIV 1+2 AB+HIV1 P24 AG SERPL QL IA: NEGATIVE
RPR SER QL: NORMAL
RUBV IGG SER-ACNC: 25.1 IU/ML
RUBV IGG SER-IMP: REACTIVE

## 2018-10-09 ENCOUNTER — INITIAL CONSULT (OUTPATIENT)
Dept: MATERNAL FETAL MEDICINE | Facility: CLINIC | Age: 43
End: 2018-10-09
Payer: MEDICAID

## 2018-10-09 ENCOUNTER — PROCEDURE VISIT (OUTPATIENT)
Dept: MATERNAL FETAL MEDICINE | Facility: CLINIC | Age: 43
End: 2018-10-09
Payer: MEDICAID

## 2018-10-09 VITALS
WEIGHT: 265.63 LBS | BODY MASS INDEX: 37.05 KG/M2 | SYSTOLIC BLOOD PRESSURE: 104 MMHG | DIASTOLIC BLOOD PRESSURE: 70 MMHG

## 2018-10-09 DIAGNOSIS — O24.414 INSULIN CONTROLLED GESTATIONAL DIABETES MELLITUS (GDM) IN THIRD TRIMESTER: Primary | ICD-10-CM

## 2018-10-09 DIAGNOSIS — Z36.89 ENCOUNTER FOR ULTRASOUND TO CHECK FETAL GROWTH: ICD-10-CM

## 2018-10-09 DIAGNOSIS — O09.523 ADVANCED MATERNAL AGE IN MULTIGRAVIDA, THIRD TRIMESTER: ICD-10-CM

## 2018-10-09 PROCEDURE — 76816 OB US FOLLOW-UP PER FETUS: CPT | Mod: 26,S$PBB,, | Performed by: OBSTETRICS & GYNECOLOGY

## 2018-10-09 PROCEDURE — 99212 OFFICE O/P EST SF 10 MIN: CPT | Mod: PBBFAC,TH | Performed by: OBSTETRICS & GYNECOLOGY

## 2018-10-09 PROCEDURE — 99213 OFFICE O/P EST LOW 20 MIN: CPT | Mod: S$PBB,TH,25, | Performed by: OBSTETRICS & GYNECOLOGY

## 2018-10-09 PROCEDURE — 99999 PR PBB SHADOW E&M-EST. PATIENT-LVL II: CPT | Mod: PBBFAC,,, | Performed by: OBSTETRICS & GYNECOLOGY

## 2018-10-09 PROCEDURE — 76816 OB US FOLLOW-UP PER FETUS: CPT | Mod: PBBFAC | Performed by: OBSTETRICS & GYNECOLOGY

## 2018-10-09 NOTE — LETTER
October 9, 2018      Lissa Nicholas, FNP-C  [address]  [facility] [address]           [facility]  [address] [address]  Phone: [phone]          Patient: Stephanie Russell   MR Number: [id number]   YOB: 1975   Date of Visit: 10/9/2018       Dear Lissa Nicholas:    Thank you for referring Stephanie Russell to me for evaluation. Attached you will find relevant portions of my assessment and plan of care.    If you have questions, please do not hesitate to call me. I look forward to following Stephanie Russell along with you.    Sincerely,    Froylan Gonzalez MD    Enclosure  CC:  No Recipients    If you would like to receive this communication electronically, please contact [email or web address] or [phone] to request more information on [employer] Link access.    For providers and/or their staff who would like to refer a patient to [facility], please contact us through our one-stop-shop provider referral line, [facility], at [phone].    If you feel you have received this communication in error or would no longer like to receive these types of communications, please e-mail [email or web address]

## 2018-10-09 NOTE — PROGRESS NOTES
"Indication  ========    Evaluation of fetal growth.    History  ======    Risk Factors  Details: previous pregnancy with Down Syndrome  Details: AMA  Details: obesity    Pregnancy History  ==============    Maternal Lab Tests  Test: Cell free fetal DNA analysis  Result:  negative ([id number])    Wants to know gender: yes    Method  ======    Transabdominal ultrasound examination, 2D Color Doppler, Voluson E10. View: Good view.    Pregnancy  =========    Russell pregnancy. Number of fetuses: 1.    Dating  ======    GA by "stated dating" 31 w + 6 d  TUAN by "stated dating": 12/5/2018  "Stated dating" by: Doctor's office  Ultrasound examination on: 10/9/2018  GA by U/S based upon: AC, BPD, Femur, HC  GA by U/S 32 w + 6 d  TUAN by U/S: 11/28/2018  Assigned: Dating performed on 08/28/2018, based on the external assessment (by Doctor's office)  Assigned GA 31 w + 6 d  Assigned TUAN: 12/5/2018    General Evaluation  ==============    Cardiac activity: present.  bpm.  Fetal movements: visualized.  Presentation: breech.  Placenta:  Placental site: posterior, left.  Amniotic fluid: MVP 7.6 cm.    Fetal Biometry  ============    Fetal Biometry  BPD 80.8 mm 32w 3d Hadlock  .6 mm 35w 4d Gabby  .7 mm 34w 0d Hadlock  .4 mm 33w 0d Hadlock  Femur 61.6 mm 32w 0d Hadlock  EFW 2,047 g 42% Arsenio  Calculated by: Hadlock (BPD-HC-AC-FL)  EFW (lb) 4 lb  EFW (oz) 8 oz  Cephalic index 0.75  HC / AC 1.05  FL / BPD 0.76  FL / AC 0.21  MVP 7.6 cm   bpm    Fetal Anatomy  ===========    Cranium: appears normal  4-chamber view: documented previously  Heart / Thorax: Patient seen by fetal echo 9/19 - tech difficult study but wnl  Stomach: normal  Kidneys: normal  Bladder: normal  Gender: male  Wants to know gender: yes    Consultation  ==========    Stephanie returns today for follow-up for pre gestational diabetes.    Unfortunately, she did not bring her blood sugar log today. By her report she says that she has had " some elevated blood sugars and so is  increased her insulin to NPH 68 units before breakfast and regular 16 units 3 times daily before meals. I discussed with her at length the  importance of bringing her blood glucose measurements to the clinic or at least bring in her meter so that we can review them. We discussed  the implications of diabetes in pregnancy including  hypoglycemia, stillbirth, and fetal macrosomia among other issues.    Because of her poor obstetric history and her increased risks for stillbirth associated with diabetes and advanced maternal age, she needs  twice weekly  testing beginning next week with biophysical profile once a week and NST once a week. At the time of her last  , because of the extensive scar tissue, a classical incision was made and therefore she will need a repeat  with this  pregnancy. Given all of the comorbidities and the increased risk of uterine rupture, would recommend delivery at 36 to 37 weeks.    A follow-up ultrasound was performed today. Fetal growth was at the 40 sec percentile. The amniotic fluid volume was normal with an MVP of  7.6 cm. Limited anatomy was reassuring but views remains suboptimal due to her habitus and extensive scar tissue.    We called in refills on her NPH and regular today, but instructed her that she needs to bring her blood sugar log to prenatal testing weekly so  that we could review her blood glucose control and make further adjustments to her insulin regimen as needed. Will plan to see her back in  approximately 2 weeks to review her blood sugar log and will coordinate that appointment as 1 of her prenatal testing visits ideally at the [facility].    Face-to-face time 15 min, greater than 50% in counseling and care coordination.    Impression  =========    Fetal size is AGA with the EFW at the 42nd percentile.  Normal repeat limited fetal anatomic survey. AFV is normal.    Pre-gestational diabetes  on insulin--non-compliant with blood sugar log so unable to make adjustments to regimen.    Recommendation  ==============    1. Begin twice weekly  testing next week.  2. Repeat  delivery at 36 to 37 weeks due to prior classical incision.  3. Patient instructed to bring her blood glucose log to prenatal testing next week.

## 2018-10-15 ENCOUNTER — ROUTINE PRENATAL (OUTPATIENT)
Dept: OBSTETRICS AND GYNECOLOGY | Facility: CLINIC | Age: 43
End: 2018-10-15
Payer: MEDICAID

## 2018-10-15 VITALS
WEIGHT: 266.31 LBS | BODY MASS INDEX: 37.14 KG/M2 | SYSTOLIC BLOOD PRESSURE: 110 MMHG | DIASTOLIC BLOOD PRESSURE: 66 MMHG

## 2018-10-15 DIAGNOSIS — E11.9 TYPE 2 DIABETES MELLITUS WITHOUT COMPLICATION, UNSPECIFIED WHETHER LONG TERM INSULIN USE: Primary | ICD-10-CM

## 2018-10-15 PROCEDURE — 99999 PR PBB SHADOW E&M-EST. PATIENT-LVL II: CPT | Mod: PBBFAC,,, | Performed by: STUDENT IN AN ORGANIZED HEALTH CARE EDUCATION/TRAINING PROGRAM

## 2018-10-15 PROCEDURE — 99212 OFFICE O/P EST SF 10 MIN: CPT | Mod: PBBFAC,PO | Performed by: STUDENT IN AN ORGANIZED HEALTH CARE EDUCATION/TRAINING PROGRAM

## 2018-10-15 PROCEDURE — 99213 OFFICE O/P EST LOW 20 MIN: CPT | Mod: TH,S$PBB,, | Performed by: STUDENT IN AN ORGANIZED HEALTH CARE EDUCATION/TRAINING PROGRAM

## 2018-10-15 NOTE — PROGRESS NOTES
Complaints today: No complaints today. No vaginal bleeding or loss of fluid. Intermittent Raymon Montes, normal fetal movement.    Patient's current insulin regimen is 70 units Novolin in the morning, regular 18 units with breakfast, lunch and dinner. Patient states she tries to take her insulin regularly. Has brought her glucose log today - fasting , post prandial breakfast 122-136, post prandial lunch/dinner 136-176. These glucose logs reflect changes in her insulin regimen by MFM from 58 Novolin and 12 regular.     /66   Wt 120.8 kg (266 lb 5.1 oz)   LMP 2018   BMI 37.14 kg/m²     43 y.o., at 32w5d by Estimated Date of Delivery: 18  Patient Active Problem List   Diagnosis    AMA (advanced maternal age) multigravida 35+ - needs u/s at 32 weeks    Type 2 diabetes mellitus    Down syndrome in child of prior pregnancy, currently pregnant    H/O  section - classical, needs repeat 36-37 weeks    H/O congenital cardiac septal defect - peds card consult    Obesity    Insulin controlled gestational diabetes mellitus (GDM) during pregnancy    Obesity complicating pregnancy    Mild recurrent major depression     OB History    Para Term  AB Living   8 4 3 1 3 3   SAB TAB Ectopic Multiple Live Births   2   1 0 3      # Outcome Date GA Lbr Nikhil/2nd Weight Sex Delivery Anes PTL Lv   8 Current            7 SAB 2017           6 Term 17 38w0d  3.714 kg (8 lb 3 oz) M CS-Classical Spinal N MITCHELL   5 SAB    0.4 kg (14.1 oz) U   Y FD   4     0.5 kg (1 lb 1.6 oz) U   Y FD      Complications: Nuchal cord   3 Term    3.175 kg (7 lb) F CS-LTranv      2 Ectopic            1 Term    4.649 kg (10 lb 4 oz) M CS-Unspec  N MITCHELL      Complications: Failure to progress in labor          Dating reviewed    Allergies and problem list reviewed and updated    Medical and surgical history reviewed    Prenatal labs reviewed and updated    Physical Exam:  ABD: soft,  gravid, nontender    Assessment:  Stephanie was seen today for routine prenatal visit.    Diagnoses and all orders for this visit:    Type 2 diabetes mellitus without complication, unspecified whether long term insulin use  -     Prenatal Testing-Future (1 visit)      PLAN:   1. H/O  section - classical, needs repeat 36-37 weeks   - Repeat  section scheduled for  at noon with Dr. barrera     2. Insulin controlled gestational diabetes mellitus (GDM) in third trimester  - Counseled pt on importance of taking insulin regularly  - PNT offered appointment today given already 32w5d however, patient refused stating it does not work with her schedule  - MFM follow up scheduled for 10/22     3. Elderly multigravida in third trimester  - T3 labs completed last week  - Patient plants to breastfeed  - Does not want any contraception post partum  - Declines flu and tdap today     4. Down syndrome in child of prior pregnancy, currently pregnant     5. H/O congenital cardiac septal defect - peds card consult  - fetal echo normal     6. Obesity affecting pregnancy in third trimester      Follow up in 1 Weeks, kick counts, labor precautions

## 2018-10-15 NOTE — PROGRESS NOTES
Pt refuses to begin PNT this week.  MFM contacted concerning blood sugars and insulin.  C/S scheduled on 11/14 at noon.

## 2018-10-22 ENCOUNTER — ROUTINE PRENATAL (OUTPATIENT)
Dept: OBSTETRICS AND GYNECOLOGY | Facility: CLINIC | Age: 43
End: 2018-10-22
Payer: MEDICAID

## 2018-10-22 ENCOUNTER — PROCEDURE VISIT (OUTPATIENT)
Dept: MATERNAL FETAL MEDICINE | Facility: CLINIC | Age: 43
End: 2018-10-22
Attending: OBSTETRICS & GYNECOLOGY
Payer: MEDICAID

## 2018-10-22 VITALS
BODY MASS INDEX: 37.11 KG/M2 | DIASTOLIC BLOOD PRESSURE: 72 MMHG | SYSTOLIC BLOOD PRESSURE: 110 MMHG | WEIGHT: 266.13 LBS

## 2018-10-22 VITALS
BODY MASS INDEX: 37.14 KG/M2 | DIASTOLIC BLOOD PRESSURE: 70 MMHG | SYSTOLIC BLOOD PRESSURE: 120 MMHG | WEIGHT: 266.31 LBS

## 2018-10-22 DIAGNOSIS — E11.9 TYPE 2 DIABETES MELLITUS WITHOUT COMPLICATION, WITH LONG-TERM CURRENT USE OF INSULIN: ICD-10-CM

## 2018-10-22 DIAGNOSIS — F33.0 MILD RECURRENT MAJOR DEPRESSION: ICD-10-CM

## 2018-10-22 DIAGNOSIS — B37.31 CANDIDIASIS OF VULVA AND VAGINA: ICD-10-CM

## 2018-10-22 DIAGNOSIS — Z98.891 H/O CESAREAN SECTION: ICD-10-CM

## 2018-10-22 DIAGNOSIS — O09.299 DOWN SYNDROME IN CHILD OF PRIOR PREGNANCY, CURRENTLY PREGNANT: ICD-10-CM

## 2018-10-22 DIAGNOSIS — O24.112 PRE-EXISTING TYPE 2 DIABETES MELLITUS DURING PREGNANCY IN SECOND TRIMESTER: ICD-10-CM

## 2018-10-22 DIAGNOSIS — O24.113 PRE-EXISTING TYPE 2 DIABETES MELLITUS DURING PREGNANCY IN THIRD TRIMESTER: Primary | ICD-10-CM

## 2018-10-22 DIAGNOSIS — Z87.74 H/O CONGENITAL CARDIAC SEPTAL DEFECT: Primary | ICD-10-CM

## 2018-10-22 DIAGNOSIS — Z79.4 TYPE 2 DIABETES MELLITUS WITHOUT COMPLICATION, WITH LONG-TERM CURRENT USE OF INSULIN: ICD-10-CM

## 2018-10-22 DIAGNOSIS — O24.414 INSULIN CONTROLLED GESTATIONAL DIABETES MELLITUS (GDM) IN THIRD TRIMESTER: ICD-10-CM

## 2018-10-22 DIAGNOSIS — Z87.74 H/O CONGENITAL CARDIAC SEPTAL DEFECT: ICD-10-CM

## 2018-10-22 DIAGNOSIS — E66.9 OBESITY, UNSPECIFIED CLASSIFICATION, UNSPECIFIED OBESITY TYPE, UNSPECIFIED WHETHER SERIOUS COMORBIDITY PRESENT: ICD-10-CM

## 2018-10-22 DIAGNOSIS — O09.523 ELDERLY MULTIGRAVIDA IN THIRD TRIMESTER: ICD-10-CM

## 2018-10-22 PROCEDURE — 99999 PR PBB SHADOW E&M-EST. PATIENT-LVL II: CPT | Mod: PBBFAC,,, | Performed by: OBSTETRICS & GYNECOLOGY

## 2018-10-22 PROCEDURE — 87102 FUNGUS ISOLATION CULTURE: CPT

## 2018-10-22 PROCEDURE — 76819 FETAL BIOPHYS PROFIL W/O NST: CPT | Mod: 26,S$PBB,, | Performed by: OBSTETRICS & GYNECOLOGY

## 2018-10-22 PROCEDURE — 99212 OFFICE O/P EST SF 10 MIN: CPT | Mod: PBBFAC,TH,25 | Performed by: OBSTETRICS & GYNECOLOGY

## 2018-10-22 PROCEDURE — 76819 FETAL BIOPHYS PROFIL W/O NST: CPT | Mod: PBBFAC | Performed by: OBSTETRICS & GYNECOLOGY

## 2018-10-22 PROCEDURE — 99213 OFFICE O/P EST LOW 20 MIN: CPT | Mod: TH,S$PBB,, | Performed by: STUDENT IN AN ORGANIZED HEALTH CARE EDUCATION/TRAINING PROGRAM

## 2018-10-22 PROCEDURE — 87106 FUNGI IDENTIFICATION YEAST: CPT

## 2018-10-22 PROCEDURE — 99999 PR PBB SHADOW E&M-EST. PATIENT-LVL III: CPT | Mod: PBBFAC,,, | Performed by: STUDENT IN AN ORGANIZED HEALTH CARE EDUCATION/TRAINING PROGRAM

## 2018-10-22 PROCEDURE — 99213 OFFICE O/P EST LOW 20 MIN: CPT | Mod: 25,S$PBB,TH, | Performed by: OBSTETRICS & GYNECOLOGY

## 2018-10-22 PROCEDURE — 99213 OFFICE O/P EST LOW 20 MIN: CPT | Mod: PBBFAC,27,TH,PO,25 | Performed by: STUDENT IN AN ORGANIZED HEALTH CARE EDUCATION/TRAINING PROGRAM

## 2018-10-22 RX ORDER — FLUCONAZOLE 100 MG/1
150 TABLET ORAL DAILY
Qty: 14 TABLET | Refills: 0 | Status: SHIPPED | OUTPATIENT
Start: 2018-10-22 | End: 2018-10-23 | Stop reason: ALTCHOICE

## 2018-10-22 NOTE — PROGRESS NOTES
"FUV : Type 2 DM, Polyhydramnios    44y/o  EDC 18; 33w5d    Chart reviewed  Dr. Gonzalez's last note is reviewed - EFW at the 42nd percentile Oct 9th  Pt interviewed and examined  Ultrasound performed  Interval problems - doing well  No leaking, bleeding or discharge  Good FM    OB HX:   "Fabien" 10#4oz, C/S for failure to progress, baby on ABX for a few days after delivery,  - ectopic pregnancy   "Khayenna" 7#, repeat C/S, In [facility] for 1 month prior to delivery for unstable BS, readmitted for wound dehiscence  - "Lloyd" stillborn at 6mth, about 500 grams; went to dr for no fetal movement, induction of labor; nuchal cord x 4  - "Benjamin" stillborn at 4mth, < 400 grams; went to dr for no fetal movement, Pt reports "work up done"; no etiology found   - Rapha; 8-3; Trisomy 21 with AVCD; RC/S    Type 2 DM:  BS checks QID - did not bring her log  FBS - 118 this AM  After meals - 130-140  Insulin-  Novalin N 70u in am  Novalin R 18 units with each meal  Her post supper BS are elevated; pt reports that she is not taking her Regular until after she eats dinner (she takes insulin pre-meal at other  times)  I instructed in the importance of taking her Regular 20-30 minutes before her meal    Surgical HX:  ectopic preg  C/S x 3.    Impression  =========  Fetal surveillance is reassuring,  Mild Polyhydramnios  Type 2 DM on insulin; with elevated BS post supper  Morbid Obesity  Prior C/S x 3  Hx of C/S scar dehiscence.    Recommendation  ==============  Pt will move her evening regular to pre-supper  Continue fetal surveillance as previously outlined.    "

## 2018-10-22 NOTE — ADDENDUM NOTE
Addended by: MODESTO PANIAGUA III on: 10/22/2018 10:52 AM     Modules accepted: Level of Service

## 2018-10-22 NOTE — LETTER
October 22, 2018      Lissa Nicholas MD  [address]  [address]  [place] [facility] [address]           [facility] - [facility]  [address]  [place] [address]  Phone: [phone]          Patient: Stephanie Russell   MR Number: [id number]   YOB: 1975   Date of Visit: 10/22/2018       Dear Dr. Lissa Nicholas:    Thank you for referring Stephanie Russell to me for evaluation. Attached you will find relevant portions of my assessment and plan of care.    If you have questions, please do not hesitate to call me. I look forward to following Stephanie Russell along with you.    Sincerely,    Shilo Nunes III, MD    Enclosure  CC:  No Recipients    If you would like to receive this communication electronically, please contact [email or web address] or [phone] to request more information on [employer] Link access.    For providers and/or their staff who would like to refer a patient to [facility], please contact us through our one-stop-shop provider referral line, [facility], at [phone].    If you feel you have received this communication in error or would no longer like to receive these types of communications, please e-mail [email or web address]

## 2018-10-22 NOTE — PROGRESS NOTES
Patient to [facility] for her follow up consult for Type 2 Diabetes.     Patient's current insulin management includes:     Novolin: 70 units in the AM.     Regular: 18 units with breakfast/18 units with lunch/18 units with dinner.       Copy of blood sugar log made for Dr. Nunes for review.

## 2018-10-22 NOTE — PROGRESS NOTES
MFM to see patient on 10/29/18  Glucose log review insulin per MFM  FM pos  Wet prep = Candida, treated; Candida Culture today  Repeat C/S scheduled 11/14/18  RTC 1 week.

## 2018-10-22 NOTE — PROGRESS NOTES
"Complaints today: white vaginal "chunky" discharge with vaginal itching. No odors.   No vaginal bleeding or LOF. Intermittent [place]. Good fetal movement.     Patient's current insulin regimen is 70 units Novolin in the morning, regular 18 units with breakfast, lunch and dinner. Patient states has been taking her insulin regularly. Has brought her glucose log today - fasting , post prandial breakfast 106-144, post prandial lunch 104-151, post prandial dinner 139-160. Patient seen by MFM today who counseled patient on importance of taking Novolin regular 20-30 min before dinner meals for optimal glucose control given post prandial dinner levels are increased. Patient again verbalized understanding.     LMP 2018     43 y.o., at 33w5d by Estimated Date of Delivery: 18  Patient Active Problem List   Diagnosis    AMA (advanced maternal age) multigravida 35+ - needs u/s at 32 weeks    Type 2 diabetes mellitus    Down syndrome in child of prior pregnancy, currently pregnant    H/O  section - classical, needs repeat 36-37 weeks    H/O congenital cardiac septal defect - peds card consult    Obesity    Insulin controlled gestational diabetes mellitus (GDM) during pregnancy    Obesity complicating pregnancy    Mild recurrent major depression     OB History    Para Term  AB Living   8 4 3 1 3 3   SAB TAB Ectopic Multiple Live Births   2   1 0 3      # Outcome Date GA Lbr Nikhil/2nd Weight Sex Delivery Anes PTL Lv   8 Current            7 SAB 2017           6 Term 17 38w0d  3.714 kg (8 lb 3 oz) M CS-Classical Spinal N MITCHELL   5 SAB    0.4 kg (14.1 oz) U   Y FD   4     0.5 kg (1 lb 1.6 oz) U   Y FD      Complications: Nuchal cord   3 Term    3.175 kg (7 lb) F CS-LTranv      2 Ectopic            1 Term    4.649 kg (10 lb 4 oz) M CS-Unspec  N MITCHELL      Complications: Failure to progress in labor          Dating reviewed    Allergies and problem " list reviewed and updated    Medical and surgical history reviewed    Prenatal labs reviewed and updated    Physical Exam:  ABD: soft, gravid, nontender    Assessment:  There are no diagnoses linked to this encounter.      PLAN:   1. H/O  section - classical, needs repeat 36-37 weeks   - Repeat  section scheduled for  at noon with Dr. barrera     2. Insulin controlled gestational diabetes mellitus (GDM) in third trimester  - Counseled pt on importance of taking insulin regularly  - Counseled by MFM regarding taking Novolin regular 20-30 min before meals given elevated post prandial glucose levels after dinner  - Follow up with MFM scheduled for 10/29     3. Elderly multigravida in third trimester  - Complaining of vaginal discharge, candida culture collected  - Patient plants to breastfeed  - Does not want any contraception post partum  - Declines flu and tdap today     4. Down syndrome in child of prior pregnancy, currently pregnant     5. H/O congenital cardiac septal defect - peds card consult  - fetal echo normal  - Follow up with perinatology 10/25     6. Obesity affecting pregnancy in third trimester      Follow up in 1 Weeks, kick counts, labor precautions

## 2018-10-23 ENCOUNTER — TELEPHONE (OUTPATIENT)
Dept: OBSTETRICS AND GYNECOLOGY | Facility: CLINIC | Age: 43
End: 2018-10-23

## 2018-10-23 DIAGNOSIS — B37.9 CANDIDA ALBICANS INFECTION: Primary | ICD-10-CM

## 2018-10-23 RX ORDER — TERCONAZOLE 4 MG/G
1 CREAM VAGINAL DAILY
Qty: 45 G | Refills: 0 | Status: SHIPPED | OUTPATIENT
Start: 2018-10-23 | End: 2018-11-28

## 2018-10-23 NOTE — TELEPHONE ENCOUNTER
----- Message from Jessica Chester sent at 10/23/2018 10:06 AM CDT -----  Contact: Patient   Patient was seen on yesterday and given a prescription that the pharmacy is having a problem filling. The patient can be reached at [phone].

## 2018-10-25 ENCOUNTER — HOSPITAL ENCOUNTER (OUTPATIENT)
Dept: PERINATAL CARE | Facility: OTHER | Age: 43
Discharge: HOME | End: 2018-10-25
Attending: OBSTETRICS & GYNECOLOGY
Payer: MEDICAID

## 2018-10-25 DIAGNOSIS — E11.9 TYPE 2 DIABETES MELLITUS WITHOUT COMPLICATION, UNSPECIFIED WHETHER LONG TERM INSULIN USE: ICD-10-CM

## 2018-10-25 PROCEDURE — 59025 FETAL NON-STRESS TEST: CPT | Mod: 26,,, | Performed by: OBSTETRICS & GYNECOLOGY

## 2018-10-25 PROCEDURE — 59025 FETAL NON-STRESS TEST: CPT

## 2018-10-29 ENCOUNTER — PROCEDURE VISIT (OUTPATIENT)
Dept: MATERNAL FETAL MEDICINE | Facility: CLINIC | Age: 43
End: 2018-10-29
Payer: MEDICAID

## 2018-10-29 ENCOUNTER — INITIAL CONSULT (OUTPATIENT)
Dept: MATERNAL FETAL MEDICINE | Facility: CLINIC | Age: 43
End: 2018-10-29
Payer: MEDICAID

## 2018-10-29 VITALS
BODY MASS INDEX: 37.54 KG/M2 | DIASTOLIC BLOOD PRESSURE: 70 MMHG | WEIGHT: 269.19 LBS | SYSTOLIC BLOOD PRESSURE: 114 MMHG

## 2018-10-29 DIAGNOSIS — O24.414 INSULIN CONTROLLED GESTATIONAL DIABETES MELLITUS (GDM) IN FIRST TRIMESTER: Primary | ICD-10-CM

## 2018-10-29 DIAGNOSIS — Z36.89 ENCOUNTER FOR ULTRASOUND TO CHECK FETAL GROWTH: Primary | ICD-10-CM

## 2018-10-29 DIAGNOSIS — Z87.74 H/O CONGENITAL CARDIAC SEPTAL DEFECT: ICD-10-CM

## 2018-10-29 DIAGNOSIS — O24.112 PRE-EXISTING TYPE 2 DIABETES MELLITUS DURING PREGNANCY IN SECOND TRIMESTER: ICD-10-CM

## 2018-10-29 DIAGNOSIS — O09.299 DOWN SYNDROME IN CHILD OF PRIOR PREGNANCY, CURRENTLY PREGNANT: ICD-10-CM

## 2018-10-29 DIAGNOSIS — E11.00 TYPE 2 DIABETES MELLITUS WITH HYPEROSMOLARITY WITHOUT COMA, WITHOUT LONG-TERM CURRENT USE OF INSULIN: ICD-10-CM

## 2018-10-29 PROCEDURE — 76815 OB US LIMITED FETUS(S): CPT | Mod: PBBFAC | Performed by: PEDIATRICS

## 2018-10-29 PROCEDURE — 99213 OFFICE O/P EST LOW 20 MIN: CPT | Mod: PBBFAC,TH,25 | Performed by: PEDIATRICS

## 2018-10-29 PROCEDURE — 99999 PR PBB SHADOW E&M-EST. PATIENT-LVL III: CPT | Mod: PBBFAC,,, | Performed by: PEDIATRICS

## 2018-10-29 PROCEDURE — 76819 FETAL BIOPHYS PROFIL W/O NST: CPT | Mod: PBBFAC | Performed by: PEDIATRICS

## 2018-10-29 PROCEDURE — 99213 OFFICE O/P EST LOW 20 MIN: CPT | Mod: S$PBB,TH,25, | Performed by: PEDIATRICS

## 2018-10-29 PROCEDURE — 76815 OB US LIMITED FETUS(S): CPT | Mod: 26,S$PBB,, | Performed by: PEDIATRICS

## 2018-10-29 PROCEDURE — 76819 FETAL BIOPHYS PROFIL W/O NST: CPT | Mod: 26,S$PBB,, | Performed by: PEDIATRICS

## 2018-10-29 NOTE — PROGRESS NOTES
"Indication  ========    F/U Consultation: BPP; blood sugar check.    History  ======    General History  Other: DM Type II: insulin  AMA  Previous Outcomes  Preg. no. 1  Outcome: Live birth  Gender: male  Details: , term, c/s , 10lbs 4oz, "Fabien"  Preg. no. 2  Outcome: Ectopic pregnancy  Details:   Preg. no. 3  Outcome: Live birth  Gest. age 36 w + 0 d  Gender: female  Details: , 7lbs, c/s, " Khayenna" , low trans  Preg. no. 4  Outcome: Antepartum stillbirth  Details: , 1lbs 2oz, not living  Preg. no. 5  Outcome: Spontaneous miscarriage  Details: , 14oz  Preg. no. 6  Outcome: Live YOB: 2016  Gest. age 38 w + 0 d  Gender: male  Details: c/s classical, 8lbs 3oz, "Rapha"  Preg. no. 7  Outcome: Spontaneous miscarriage  Details: 2017   8  Para 3  Russell children born living (T) 3  Russell children born (T) 2  Russell children born (P) 1  Abortions (A) 3  Russell living children (L) 3  Russell children born living (P) 1  Stillbirths 1  Ectopic 1  Risk Factors  Details: previous pregnancy with Down Syndrome  Details: AMA  Details: obesity  Details: former smoker  Details: previous ectopic pregnancy & surgery  Details: DM Type II    Maternal Assessment  =================    Weight 122 kg  Weight (lb) 269 lb  BP syst 114 mmHg  BP diast 70 mmHg    Method  ======    Transabdominal ultrasound examination, Voluson E10. View: Good view.    Pregnancy  =========    Russell pregnancy. Number of fetuses: 1.    Dating  ======    GA by "stated dating" 34 w + 5 d  TUAN by "stated dating": 2018  "Stated dating" by: Doctor's office  Assigned: Dating performed on 2018, based on the external assessment (by Doctor's office)  Assigned GA 34 w + 5 d  Assigned TUAN: 2018    General Evaluation  ==============    Cardiac activity: present.  bpm.  Fetal movements: visualized.  Presentation: transverse, head to maternal left.  Placenta: posterior, left.  Umbilical cord: " documented previously.    Amniotic Fluid Assessment  =====================    Amount of AF: normal amount  MVP 6.2 cm    Biophysical Profile  ==============    2: Fetal breathing movements  2: Gross body movements  2: Fetal tone  2: Amniotic fluid volume  : Biophysical profile score  Interpretation: normal    Consultation  ==========    Type 2 DM:    Ms. Russell is a 43-year-old  8 para 3133 with an TUAN of 2018. She is 34 weeks and 5 days today. She has a repeat   section scheduled for . Ms. Russell is a type 2 diabetic. She states that she and [facility] have been managing her  blood sugars. She prefers not to have much active physician involvement.      We reviewed her blood sugars today.    She has been on 70 mg Novolin NPH at breakfast and 18 units of Novolin regular at each meal. She states that she has been taking her  regular at least 20 min prior to the meal. See Dr. Kim shows last note for the discussion.    Her blood sugar log indicates that less than half of her fastings are now elevated. She states she has actually been going low in the middle of  the night and eats at that time. I asked her to make note of when she is low and when she eats separate from her regular meals and snacks. I  suggested that this would help us evaluate the actions of her insulin doses. Abdomen    Blood sugars for breakfast are about 50% elevated. Blood sugars following lunch and dinner are over 70-80% elevated. The elevations are 160  or less.    I recommended the following doses of insulin in units:    NPH/regular: 70/20, X/20, X/22, and X/X.    all questions were answered      I spent 15 min in patient care management and consultation with greater than 50% face-to-face.            Impression  =========    BPP 8 of 8.  MVP normal.              Recommendation  ==============    1. Insulin changes as above.  2. Twice weekly APT.  3. Repeat C/S on .  4. Other testing as  clinically indicated.    Thank you again for allowing us to participate in the care of your patients. If you have any questions concerning today's consultation, feel free  to contact me or one of my partners. We can be reached at [phone] during normal business hours. If you have a question after normal  business hours, please contact Labor and Delivery at [phone].

## 2018-10-29 NOTE — LETTER
October 29, 2018      Lissa Nicholas MD  [address]  [address]  [place] [facility] [address]           [facility] - [facility]  [address]  [facility] [place] [address]  Phone: [phone]          Patient: Stephanie Russell   MR Number: [id number]   YOB: 1975   Date of Visit: 10/29/2018       Dear Dr. Lissa Nicholas:    Thank you for referring Stephanie Russell to me for evaluation. Attached you will find relevant portions of my assessment and plan of care.    If you have questions, please do not hesitate to call me. I look forward to following Stephanie Russell along with you.    Sincerely,    Naima Zurita MD    Enclosure  CC:  No Recipients    If you would like to receive this communication electronically, please contact [email or web address] or [phone] to request more information on [employer] Link access.    For providers and/or their staff who would like to refer a patient to [facility], please contact us through our one-stop-shop provider referral line, [facility], at [phone].    If you feel you have received this communication in error or would no longer like to receive these types of communications, please e-mail [email or web address]

## 2018-10-29 NOTE — PROGRESS NOTES
"Indication  ========    F/U Consultation: BPP; blood sugar check.    History  ======    General History  Other: DM Type II: insulin  AMA  Previous Outcomes  Preg. no. 1  Outcome: Live birth  Gender: male  Details: , term, c/s , 10lbs 4oz, "Fabien"  Preg. no. 2  Outcome: Ectopic pregnancy  Details:   Preg. no. 3  Outcome: Live birth  Gest. age 36 w + 0 d  Gender: female  Details: , 7lbs, c/s, " Khayenna" , low trans  Preg. no. 4  Outcome: Antepartum stillbirth  Details: , 1lbs 2oz, not living  Preg. no. 5  Outcome: Spontaneous miscarriage  Details: , 14oz  Preg. no. 6  Outcome: Live YOB: 2016  Gest. age 38 w + 0 d  Gender: male  Details: c/s classical, 8lbs 3oz, "Rapha"  Preg. no. 7  Outcome: Spontaneous miscarriage  Details: 2017   8  Para 3  Russell children born living (T) 3  Russell children born (T) 2  Russell children born (P) 1  Abortions (A) 3  Russell living children (L) 3  Russell children born living (P) 1  Stillbirths 1  Ectopic 1  Risk Factors  Details: previous pregnancy with Down Syndrome  Details: AMA  Details: obesity  Details: former smoker  Details: previous ectopic pregnancy & surgery  Details: DM Type II    Maternal Assessment  =================    Weight 122 kg  Weight (lb) 269 lb  BP syst 114 mmHg  BP diast 70 mmHg    Method  ======    Transabdominal ultrasound examination, Voluson E10. View: Good view.    Pregnancy  =========    Rusesll pregnancy. Number of fetuses: 1.    Dating  ======    GA by "stated dating" 34 w + 5 d  TUAN by "stated dating": 2018  "Stated dating" by: Doctor's office  Assigned: Dating performed on 2018, based on the external assessment (by Doctor's office)  Assigned GA 34 w + 5 d  Assigned TUAN: 2018    General Evaluation  ==============    Cardiac activity: present.  bpm.  Fetal movements: visualized.  Presentation: transverse, head to maternal left.  Placenta: posterior, left.  Umbilical cord: " documented previously.    Amniotic Fluid Assessment  =====================    Amount of AF: normal amount  MVP 6.2 cm    Biophysical Profile  ==============    2: Fetal breathing movements  2: Gross body movements  2: Fetal tone  2: Amniotic fluid volume  : Biophysical profile score  Interpretation: normal            Consultation  ==========    Type 2 DM:    Ms. Russell is a 43-year-old  8 para 3133 with an TUAN of 2018. She is 34 weeks and 5 days today. She has a repeat   section scheduled for . Ms. Russell is a type 2 diabetic. She states that she and [facility] have been managing her  blood sugars. She prefers not to have much active physician involvement.      We reviewed her blood sugars today.    She has been on 70 mg Novolin NPH at breakfast and 18 units of Novolin regular at each meal. She states that she has been taking her  regular at least 20 min prior to the meal. See Dr. Kim shows last note for the discussion.    Her blood sugar log indicates that less than half of her fastings are now elevated. She states she has actually been going low in the middle of  the night and eats at that time. I asked her to make note of when she is low and when she eats separate from her regular meals and snacks. I  suggested that this would help us evaluate the actions of her insulin doses. Abdomen    Blood sugars for breakfast are about 50% elevated. Blood sugars following lunch and dinner are over 70-80% elevated. The elevations are 160  or less.    I recommended the following doses of insulin in units:    NPH/regular: 70/20, X/20, X/22, and X/X.    all questions were answered          I spent 15 min in patient care management and consultation with greater than 50% face-to-face.              Impression  =========    BPP 8 of 8.  MVP normal.            Recommendation  ==============    1. Insulin changes as above.  2. Twice weekly APT.  3. Repeat C/S on .  4. Other  testing as clinically indicated.    Thank you again for allowing us to participate in the care of your patients. If you have any questions concerning today's consultation, feel free  to contact me or one of my partners. We can be reached at [phone] during normal business hours. If you have a question after normal  business hours, please contact Labor and Delivery at [phone].

## 2018-11-01 ENCOUNTER — HOSPITAL ENCOUNTER (OUTPATIENT)
Dept: PERINATAL CARE | Facility: OTHER | Age: 43
Discharge: HOME | End: 2018-11-01
Attending: OBSTETRICS & GYNECOLOGY
Payer: MEDICAID

## 2018-11-01 ENCOUNTER — ROUTINE PRENATAL (OUTPATIENT)
Dept: OBSTETRICS AND GYNECOLOGY | Facility: CLINIC | Age: 43
End: 2018-11-01
Payer: MEDICAID

## 2018-11-01 VITALS
WEIGHT: 269.81 LBS | BODY MASS INDEX: 37.64 KG/M2 | SYSTOLIC BLOOD PRESSURE: 120 MMHG | DIASTOLIC BLOOD PRESSURE: 70 MMHG

## 2018-11-01 DIAGNOSIS — Z34.93 PRENATAL CARE IN THIRD TRIMESTER: Primary | ICD-10-CM

## 2018-11-01 DIAGNOSIS — E11.9 TYPE 2 DIABETES MELLITUS WITHOUT COMPLICATION, UNSPECIFIED WHETHER LONG TERM INSULIN USE: ICD-10-CM

## 2018-11-01 PROCEDURE — 99999 PR PBB SHADOW E&M-EST. PATIENT-LVL III: CPT | Mod: PBBFAC,,, | Performed by: STUDENT IN AN ORGANIZED HEALTH CARE EDUCATION/TRAINING PROGRAM

## 2018-11-01 PROCEDURE — 59025 FETAL NON-STRESS TEST: CPT | Mod: 26,,, | Performed by: OBSTETRICS & GYNECOLOGY

## 2018-11-01 PROCEDURE — 99213 OFFICE O/P EST LOW 20 MIN: CPT | Mod: PBBFAC,25,PO | Performed by: STUDENT IN AN ORGANIZED HEALTH CARE EDUCATION/TRAINING PROGRAM

## 2018-11-01 PROCEDURE — 59025 FETAL NON-STRESS TEST: CPT

## 2018-11-01 PROCEDURE — 99213 OFFICE O/P EST LOW 20 MIN: CPT | Mod: 25,TH,S$PBB, | Performed by: STUDENT IN AN ORGANIZED HEALTH CARE EDUCATION/TRAINING PROGRAM

## 2018-11-01 PROCEDURE — 87081 CULTURE SCREEN ONLY: CPT

## 2018-11-01 NOTE — PROGRESS NOTES
Complaints today: none, vaginal discharge which patient previously has resolved.  No vaginal bleeding, LOF. No contractions. Good fetal movement.     /70   Wt 122.4 kg (269 lb 13.5 oz)   LMP 2018   BMI 37.64 kg/m²     43 y.o., at 35w1d by Estimated Date of Delivery: 18  Patient Active Problem List   Diagnosis    AMA (advanced maternal age) multigravida 35+ - needs u/s at 32 weeks    Type 2 diabetes mellitus    Down syndrome in child of prior pregnancy, currently pregnant    H/O  section - classical, needs repeat 36-37 weeks    H/O congenital cardiac septal defect - peds card consult    Obesity    Insulin controlled gestational diabetes mellitus (GDM) during pregnancy    Obesity complicating pregnancy    Mild recurrent major depression     OB History    Para Term  AB Living   8 4 3 1 3 3   SAB TAB Ectopic Multiple Live Births   2   1 0 3      # Outcome Date GA Lbr Nikhil/2nd Weight Sex Delivery Anes PTL Lv   8 Current            7 SAB 2017           6 Term 17 38w0d  3.714 kg (8 lb 3 oz) M CS-Classical Spinal N MITCHELL   5 SAB    0.4 kg (14.1 oz) U   Y FD   4     0.5 kg (1 lb 1.6 oz) U   Y FD      Complications: Nuchal cord   3 Term    3.175 kg (7 lb) F CS-LTranv      2 Ectopic            1 Term    4.649 kg (10 lb 4 oz) M CS-Unspec  N MITCHELL      Complications: Failure to progress in labor          Dating reviewed    Allergies and problem list reviewed and updated    Medical and surgical history reviewed    Prenatal labs reviewed and updated    Physical Exam:  ABD: soft, gravid, nontender    Assessment:  Stephanie was seen today for follow-up.    Diagnoses and all orders for this visit:    Prenatal care in third trimester  -     RPR; Future  -     HIV-1 and HIV-2 antibodies; Future      PLAN:     1. H/O  section - classical, needs repeat 36-37 weeks   - Repeat  section scheduled for  at noon with Dr. barrera     2. Insulin  controlled gestational diabetes mellitus (GDM) in third trimester  - Counseled pt on importance of taking insulin regularly  - Recent changes per MFM for blood sugar management: NPH/regular: 70/20, X/20, X/22, and X/X.  - Follow up with MFM scheduled for 11/05     3. Elderly multigravida in third trimester  - Vaginal discharge at prior visit, now resolved  - Patient plants to breastfeed  - Does not want any contraception post partum  - Declines flu and tdap today  - T3 labs will be collected at [facility] on 11/05 following MFM appointment      4. Down syndrome in child of prior pregnancy, currently pregnant     5. H/O congenital cardiac septal defect - peds card consult  - fetal echo normal  - Follow up with perinatology 10/25     6. Obesity affecting pregnancy in third trimester      Follow up in 1 Weeks, kick counts, labor precautions

## 2018-11-03 LAB — BACTERIA SPEC AEROBE CULT: NORMAL

## 2018-11-05 ENCOUNTER — INITIAL CONSULT (OUTPATIENT)
Dept: MATERNAL FETAL MEDICINE | Facility: CLINIC | Age: 43
End: 2018-11-05
Attending: OBSTETRICS & GYNECOLOGY
Payer: MEDICAID

## 2018-11-05 ENCOUNTER — PROCEDURE VISIT (OUTPATIENT)
Dept: MATERNAL FETAL MEDICINE | Facility: CLINIC | Age: 43
End: 2018-11-05
Payer: MEDICAID

## 2018-11-05 VITALS — BODY MASS INDEX: 38.28 KG/M2 | DIASTOLIC BLOOD PRESSURE: 74 MMHG | SYSTOLIC BLOOD PRESSURE: 114 MMHG | WEIGHT: 274.5 LBS

## 2018-11-05 DIAGNOSIS — O24.414 INSULIN CONTROLLED GESTATIONAL DIABETES MELLITUS (GDM) IN THIRD TRIMESTER: Primary | ICD-10-CM

## 2018-11-05 DIAGNOSIS — O09.299 DOWN SYNDROME IN CHILD OF PRIOR PREGNANCY, CURRENTLY PREGNANT: ICD-10-CM

## 2018-11-05 DIAGNOSIS — Z36.89 ENCOUNTER FOR ULTRASOUND TO CHECK FETAL GROWTH: ICD-10-CM

## 2018-11-05 DIAGNOSIS — Z87.74 H/O CONGENITAL CARDIAC SEPTAL DEFECT: ICD-10-CM

## 2018-11-05 DIAGNOSIS — O24.112 PRE-EXISTING TYPE 2 DIABETES MELLITUS DURING PREGNANCY IN SECOND TRIMESTER: ICD-10-CM

## 2018-11-05 PROCEDURE — 76819 FETAL BIOPHYS PROFIL W/O NST: CPT | Mod: PBBFAC | Performed by: OBSTETRICS & GYNECOLOGY

## 2018-11-05 PROCEDURE — 76816 OB US FOLLOW-UP PER FETUS: CPT | Mod: 26,S$PBB,, | Performed by: OBSTETRICS & GYNECOLOGY

## 2018-11-05 PROCEDURE — 99213 OFFICE O/P EST LOW 20 MIN: CPT | Mod: 25,S$PBB,TH, | Performed by: OBSTETRICS & GYNECOLOGY

## 2018-11-05 PROCEDURE — 99213 OFFICE O/P EST LOW 20 MIN: CPT | Mod: PBBFAC,TH,25 | Performed by: OBSTETRICS & GYNECOLOGY

## 2018-11-05 PROCEDURE — 76816 OB US FOLLOW-UP PER FETUS: CPT | Mod: PBBFAC | Performed by: OBSTETRICS & GYNECOLOGY

## 2018-11-05 PROCEDURE — 99999 PR PBB SHADOW E&M-EST. PATIENT-LVL III: CPT | Mod: PBBFAC,,, | Performed by: OBSTETRICS & GYNECOLOGY

## 2018-11-05 PROCEDURE — 76819 FETAL BIOPHYS PROFIL W/O NST: CPT | Mod: 26,S$PBB,, | Performed by: OBSTETRICS & GYNECOLOGY

## 2018-11-05 NOTE — PROGRESS NOTES
Follow up consultation regarding diabetes in pregnancy provided today.  Risks of uncontrolled diabetes in pregnancy reviewed once again.  Blood glucose measurements assessed. Although there are minor elevations al values are close to target range.  I discussed dietary intake as the likely reason for the elevations.   I did not adjust her medications today.      Results of today's ultrasound discussed with patient.  I spent 15 minutes with patient today over half of which was in consultation separate of her ultrasound examination.     Referring physician to receive copy of today's consultation via electronic medical record.

## 2018-11-05 NOTE — LETTER
November 5, 2018      Lissa Nicholas MD  [address]  [address]  [place] [address]           [facility] - Maternal [facility]etal [facility]  [address]  [place] [address]  Phone: [phone]          Patient: Stephanie Russell   MR Number: [id number]   YOB: 1975   Date of Visit: 11/5/2018       Dear Dr. Lissa Nicholas:    Thank you for referring Stephanie Russell to me for evaluation. Attached you will find relevant portions of my assessment and plan of care.    If you have questions, please do not hesitate to call me. I look forward to following Stephanie Russell along with you.    Sincerely,    Landon Pedraza MD    Enclosure  CC:  No Recipients    If you would like to receive this communication electronically, please contact [email or web address] or [phone] to request more information on [employer] Link access.    For providers and/or their staff who would like to refer a patient to [facility], please contact us through our one-stop-shop provider referral line, [facility], at [phone].    If you feel you have received this communication in error or would no longer like to receive these types of communications, please e-mail [email or web address]

## 2018-11-05 NOTE — PROGRESS NOTES
Patient to [facility] for her follow up consult for Type 2 Diabetes.     Patient's current insulin management includes:     NPH: 70 units in AM     Novolo with meals       Copy of blood sugar log made for Dr. Pedraza for review.     Changes following consult include:     NPH:       Novolog:

## 2018-11-08 ENCOUNTER — HOSPITAL ENCOUNTER (OUTPATIENT)
Dept: PERINATAL CARE | Facility: OTHER | Age: 43
Discharge: HOME | End: 2018-11-08
Attending: OBSTETRICS & GYNECOLOGY
Payer: MEDICAID

## 2018-11-08 DIAGNOSIS — E11.9 TYPE 2 DIABETES MELLITUS WITHOUT COMPLICATION, UNSPECIFIED WHETHER LONG TERM INSULIN USE: ICD-10-CM

## 2018-11-08 PROCEDURE — 59025 FETAL NON-STRESS TEST: CPT

## 2018-11-08 PROCEDURE — 59025 FETAL NON-STRESS TEST: CPT | Mod: 26,,, | Performed by: OBSTETRICS & GYNECOLOGY

## 2018-11-12 ENCOUNTER — PROCEDURE VISIT (OUTPATIENT)
Dept: MATERNAL FETAL MEDICINE | Facility: CLINIC | Age: 43
End: 2018-11-12
Payer: MEDICAID

## 2018-11-12 ENCOUNTER — INITIAL CONSULT (OUTPATIENT)
Dept: MATERNAL FETAL MEDICINE | Facility: CLINIC | Age: 43
End: 2018-11-12
Attending: OBSTETRICS & GYNECOLOGY
Payer: MEDICAID

## 2018-11-12 VITALS — SYSTOLIC BLOOD PRESSURE: 110 MMHG | DIASTOLIC BLOOD PRESSURE: 68 MMHG | WEIGHT: 273.13 LBS | BODY MASS INDEX: 38.1 KG/M2

## 2018-11-12 DIAGNOSIS — O24.414 INSULIN CONTROLLED GESTATIONAL DIABETES MELLITUS (GDM) IN THIRD TRIMESTER: Primary | ICD-10-CM

## 2018-11-12 DIAGNOSIS — O24.113 PRE-EXISTING TYPE 2 DIABETES MELLITUS DURING PREGNANCY IN THIRD TRIMESTER: Primary | ICD-10-CM

## 2018-11-12 DIAGNOSIS — Z36.89 ENCOUNTER FOR ULTRASOUND TO CHECK FETAL GROWTH: ICD-10-CM

## 2018-11-12 PROCEDURE — 99499 UNLISTED E&M SERVICE: CPT | Mod: S$PBB,,, | Performed by: OBSTETRICS & GYNECOLOGY

## 2018-11-12 PROCEDURE — 76816 OB US FOLLOW-UP PER FETUS: CPT | Mod: 26,S$PBB,, | Performed by: OBSTETRICS & GYNECOLOGY

## 2018-11-12 PROCEDURE — 76816 OB US FOLLOW-UP PER FETUS: CPT | Mod: PBBFAC | Performed by: OBSTETRICS & GYNECOLOGY

## 2018-11-12 PROCEDURE — 76819 FETAL BIOPHYS PROFIL W/O NST: CPT | Mod: 26,S$PBB,, | Performed by: OBSTETRICS & GYNECOLOGY

## 2018-11-12 PROCEDURE — 99213 OFFICE O/P EST LOW 20 MIN: CPT | Mod: PBBFAC,TH | Performed by: OBSTETRICS & GYNECOLOGY

## 2018-11-12 PROCEDURE — 76819 FETAL BIOPHYS PROFIL W/O NST: CPT | Mod: PBBFAC | Performed by: OBSTETRICS & GYNECOLOGY

## 2018-11-12 PROCEDURE — 99999 PR PBB SHADOW E&M-EST. PATIENT-LVL III: CPT | Mod: PBBFAC,,, | Performed by: OBSTETRICS & GYNECOLOGY

## 2018-11-12 NOTE — PROGRESS NOTES
Patient to [facility] for her follow up consult for Type 2 Diabetes.     Patient's current insulin management includes:     NPH: 70 units in AM    Regular: 20/20/22/X       Patient forgot her blood sugar log at home. Pt scheduled for delivery on 11/14/18.

## 2018-11-12 NOTE — LETTER
November 12, 2018      Lissa Nicholas MD  [address]  [address]  [place] [facility] [address]           [facility] - Maternal Fetal Med  [address]  [facility] [address]  Phone: [phone]          Patient: Stephanie Russell   MR Number: [id number]   YOB: 1975   Date of Visit: 11/12/2018       Dear Dr. Lissa Nicholas:    Thank you for referring Stephanie Russell to me for evaluation. Attached you will find relevant portions of my assessment and plan of care.    If you have questions, please do not hesitate to call me. I look forward to following Stephanie Russell along with you.    Sincerely,    Landon Pedraza MD    Enclosure  CC:  No Recipients    If you would like to receive this communication electronically, please contact [email or web address] or [phone] to request more information on [employer] Link access.    For providers and/or their staff who would like to refer a patient to [facility], please contact us through our one-stop-shop provider referral line, [facility], at [phone].    If you feel you have received this communication in error or would no longer like to receive these types of communications, please e-mail [email or web address]

## 2018-11-14 ENCOUNTER — ANESTHESIA EVENT (OUTPATIENT)
Dept: OBSTETRICS AND GYNECOLOGY | Facility: OTHER | Age: 43
End: 2018-11-14
Payer: MEDICAID

## 2018-11-14 ENCOUNTER — ANESTHESIA (OUTPATIENT)
Dept: OBSTETRICS AND GYNECOLOGY | Facility: OTHER | Age: 43
End: 2018-11-14
Payer: MEDICAID

## 2018-11-14 ENCOUNTER — HOSPITAL ENCOUNTER (INPATIENT)
Facility: OTHER | Age: 43
LOS: 3 days | Discharge: HOME | End: 2018-11-17
Attending: OBSTETRICS & GYNECOLOGY | Admitting: OBSTETRICS & GYNECOLOGY
Payer: MEDICAID

## 2018-11-14 DIAGNOSIS — E11.00 TYPE 2 DIABETES MELLITUS WITH HYPEROSMOLARITY WITHOUT COMA, WITHOUT LONG-TERM CURRENT USE OF INSULIN: ICD-10-CM

## 2018-11-14 DIAGNOSIS — Z98.891 HISTORY OF CLASSICAL CESAREAN SECTION: ICD-10-CM

## 2018-11-14 DIAGNOSIS — Z98.891 STATUS POST CESAREAN SECTION: Primary | ICD-10-CM

## 2018-11-14 DIAGNOSIS — Z98.891 H/O CESAREAN SECTION: ICD-10-CM

## 2018-11-14 PROBLEM — O24.414 INSULIN CONTROLLED GESTATIONAL DIABETES MELLITUS (GDM) DURING PREGNANCY: Status: RESOLVED | Noted: 2018-08-21 | Resolved: 2018-11-14

## 2018-11-14 PROBLEM — O99.210 OBESITY COMPLICATING PREGNANCY: Status: RESOLVED | Noted: 2018-08-21 | Resolved: 2018-11-14

## 2018-11-14 LAB
ABO + RH BLD: NORMAL
BASOPHILS # BLD AUTO: 0.01 K/UL
BASOPHILS NFR BLD: 0.1 %
BLD GP AB SCN CELLS X3 SERPL QL: NORMAL
DIFFERENTIAL METHOD: ABNORMAL
EOSINOPHIL # BLD AUTO: 0.1 K/UL
EOSINOPHIL NFR BLD: 1.3 %
ERYTHROCYTE [DISTWIDTH] IN BLOOD BY AUTOMATED COUNT: 15.8 %
HCT VFR BLD AUTO: 37.3 %
HGB BLD-MCNC: 12.9 G/DL
LYMPHOCYTES # BLD AUTO: 1.5 K/UL
LYMPHOCYTES NFR BLD: 20.4 %
MCH RBC QN AUTO: 27.7 PG
MCHC RBC AUTO-ENTMCNC: 34.6 G/DL
MCV RBC AUTO: 80 FL
MONOCYTES # BLD AUTO: 0.8 K/UL
MONOCYTES NFR BLD: 10.6 %
NEUTROPHILS # BLD AUTO: 4.8 K/UL
NEUTROPHILS NFR BLD: 67.6 %
PLATELET # BLD AUTO: 238 K/UL
PMV BLD AUTO: 9.7 FL
POCT GLUCOSE: 107 MG/DL (ref 70–110)
POCT GLUCOSE: 87 MG/DL (ref 70–110)
POCT GLUCOSE: 98 MG/DL (ref 70–110)
RBC # BLD AUTO: 4.66 M/UL
WBC # BLD AUTO: 7.1 K/UL

## 2018-11-14 PROCEDURE — 36000685 HC CESAREAN SECTION LEVEL I

## 2018-11-14 PROCEDURE — 86850 RBC ANTIBODY SCREEN: CPT

## 2018-11-14 PROCEDURE — 0DNW0ZZ RELEASE PERITONEUM, OPEN APPROACH: ICD-10-PCS | Performed by: OBSTETRICS & GYNECOLOGY

## 2018-11-14 PROCEDURE — 11000001 HC ACUTE MED/SURG PRIVATE ROOM

## 2018-11-14 PROCEDURE — 37000008 HC ANESTHESIA 1ST 15 MINUTES: Performed by: OBSTETRICS & GYNECOLOGY

## 2018-11-14 PROCEDURE — 51702 INSERT TEMP BLADDER CATH: CPT

## 2018-11-14 PROCEDURE — 63600175 PHARM REV CODE 636 W HCPCS: Performed by: ANESTHESIOLOGY

## 2018-11-14 PROCEDURE — 25000003 PHARM REV CODE 250: Performed by: ANESTHESIOLOGY

## 2018-11-14 PROCEDURE — 0DNU0ZZ RELEASE OMENTUM, OPEN APPROACH: ICD-10-PCS | Performed by: OBSTETRICS & GYNECOLOGY

## 2018-11-14 PROCEDURE — 25000003 PHARM REV CODE 250: Performed by: STUDENT IN AN ORGANIZED HEALTH CARE EDUCATION/TRAINING PROGRAM

## 2018-11-14 PROCEDURE — 27800517 HC TRAY,EPIDURAL-CONTINUOUS: Performed by: STUDENT IN AN ORGANIZED HEALTH CARE EDUCATION/TRAINING PROGRAM

## 2018-11-14 PROCEDURE — 59514 CESAREAN DELIVERY ONLY: CPT | Mod: ,,, | Performed by: ANESTHESIOLOGY

## 2018-11-14 PROCEDURE — 63600175 PHARM REV CODE 636 W HCPCS: Performed by: STUDENT IN AN ORGANIZED HEALTH CARE EDUCATION/TRAINING PROGRAM

## 2018-11-14 PROCEDURE — 27200033

## 2018-11-14 PROCEDURE — 85025 COMPLETE CBC W/AUTO DIFF WBC: CPT

## 2018-11-14 PROCEDURE — 37000009 HC ANESTHESIA EA ADD 15 MINS: Performed by: OBSTETRICS & GYNECOLOGY

## 2018-11-14 PROCEDURE — 59514 CESAREAN DELIVERY ONLY: CPT | Mod: AT,,, | Performed by: OBSTETRICS & GYNECOLOGY

## 2018-11-14 PROCEDURE — 27200918 HC ALEXIS O RING

## 2018-11-14 RX ORDER — OXYTOCIN 10 [USP'U]/ML
INJECTION, SOLUTION INTRAMUSCULAR; INTRAVENOUS
Status: DISCONTINUED | OUTPATIENT
Start: 2018-11-14 | End: 2018-11-14

## 2018-11-14 RX ORDER — INSULIN ASPART 100 [IU]/ML
11 INJECTION, SOLUTION INTRAVENOUS; SUBCUTANEOUS
Status: DISCONTINUED | OUTPATIENT
Start: 2018-11-14 | End: 2018-11-16

## 2018-11-14 RX ORDER — MUPIROCIN 20 MG/G
1 OINTMENT TOPICAL 2 TIMES DAILY
Status: DISCONTINUED | OUTPATIENT
Start: 2018-11-14 | End: 2018-11-17 | Stop reason: HOSPADM

## 2018-11-14 RX ORDER — ACETAMINOPHEN 10 MG/ML
INJECTION, SOLUTION INTRAVENOUS
Status: DISCONTINUED | OUTPATIENT
Start: 2018-11-14 | End: 2018-11-14

## 2018-11-14 RX ORDER — BISACODYL 10 MG
10 SUPPOSITORY, RECTAL RECTAL ONCE AS NEEDED
Status: ACTIVE | OUTPATIENT
Start: 2018-11-14 | End: 2018-11-14

## 2018-11-14 RX ORDER — MORPHINE SULFATE 0.5 MG/ML
INJECTION, SOLUTION EPIDURAL; INTRATHECAL; INTRAVENOUS
Status: DISCONTINUED | OUTPATIENT
Start: 2018-11-14 | End: 2018-11-14

## 2018-11-14 RX ORDER — SODIUM CHLORIDE, SODIUM LACTATE, POTASSIUM CHLORIDE, CALCIUM CHLORIDE 600; 310; 30; 20 MG/100ML; MG/100ML; MG/100ML; MG/100ML
INJECTION, SOLUTION INTRAVENOUS CONTINUOUS
Status: DISCONTINUED | OUTPATIENT
Start: 2018-11-14 | End: 2018-11-14

## 2018-11-14 RX ORDER — ONDANSETRON 2 MG/ML
INJECTION INTRAMUSCULAR; INTRAVENOUS
Status: DISCONTINUED | OUTPATIENT
Start: 2018-11-14 | End: 2018-11-14

## 2018-11-14 RX ORDER — MISOPROSTOL 200 UG/1
800 TABLET ORAL
Status: DISCONTINUED | OUTPATIENT
Start: 2018-11-14 | End: 2018-11-14

## 2018-11-14 RX ORDER — BUPIVACAINE HYDROCHLORIDE 7.5 MG/ML
INJECTION, SOLUTION INTRASPINAL
Status: DISCONTINUED | OUTPATIENT
Start: 2018-11-14 | End: 2018-11-14

## 2018-11-14 RX ORDER — KETOROLAC TROMETHAMINE 30 MG/ML
INJECTION, SOLUTION INTRAMUSCULAR; INTRAVENOUS
Status: DISCONTINUED | OUTPATIENT
Start: 2018-11-14 | End: 2018-11-14

## 2018-11-14 RX ORDER — FENTANYL CITRATE 50 UG/ML
INJECTION, SOLUTION INTRAMUSCULAR; INTRAVENOUS
Status: DISCONTINUED | OUTPATIENT
Start: 2018-11-14 | End: 2018-11-14

## 2018-11-14 RX ORDER — HYDROCODONE BITARTRATE AND ACETAMINOPHEN 5; 325 MG/1; MG/1
1 TABLET ORAL EVERY 4 HOURS PRN
Status: DISCONTINUED | OUTPATIENT
Start: 2018-11-15 | End: 2018-11-17 | Stop reason: HOSPADM

## 2018-11-14 RX ORDER — ONDANSETRON 2 MG/ML
4 INJECTION INTRAMUSCULAR; INTRAVENOUS EVERY 6 HOURS PRN
Status: DISPENSED | OUTPATIENT
Start: 2018-11-14 | End: 2018-11-15

## 2018-11-14 RX ORDER — ADHESIVE BANDAGE
30 BANDAGE TOPICAL 2 TIMES DAILY PRN
Status: DISCONTINUED | OUTPATIENT
Start: 2018-11-15 | End: 2018-11-17 | Stop reason: HOSPADM

## 2018-11-14 RX ORDER — MUPIROCIN 20 MG/G
OINTMENT TOPICAL
Status: DISCONTINUED | OUTPATIENT
Start: 2018-11-14 | End: 2018-11-14

## 2018-11-14 RX ORDER — OXYCODONE HYDROCHLORIDE 5 MG/1
5 TABLET ORAL EVERY 4 HOURS PRN
Status: ACTIVE | OUTPATIENT
Start: 2018-11-14 | End: 2018-11-15

## 2018-11-14 RX ORDER — KETOROLAC TROMETHAMINE 30 MG/ML
30 INJECTION, SOLUTION INTRAMUSCULAR; INTRAVENOUS EVERY 6 HOURS
Status: COMPLETED | OUTPATIENT
Start: 2018-11-14 | End: 2018-11-15

## 2018-11-14 RX ORDER — IBUPROFEN 600 MG/1
600 TABLET ORAL EVERY 6 HOURS
Status: DISCONTINUED | OUTPATIENT
Start: 2018-11-15 | End: 2018-11-17 | Stop reason: HOSPADM

## 2018-11-14 RX ORDER — INSULIN ASPART 100 [IU]/ML
20 INJECTION, SOLUTION INTRAVENOUS; SUBCUTANEOUS
Status: DISCONTINUED | OUTPATIENT
Start: 2018-11-14 | End: 2018-11-14

## 2018-11-14 RX ORDER — CEFAZOLIN SODIUM 1 G/3ML
INJECTION, POWDER, FOR SOLUTION INTRAMUSCULAR; INTRAVENOUS
Status: DISCONTINUED | OUTPATIENT
Start: 2018-11-14 | End: 2018-11-14

## 2018-11-14 RX ORDER — OXYTOCIN/RINGER'S LACTATE 20/1000 ML
10 PLASTIC BAG, INJECTION (ML) INTRAVENOUS
Status: DISCONTINUED | OUTPATIENT
Start: 2018-11-14 | End: 2018-11-14

## 2018-11-14 RX ORDER — SODIUM CHLORIDE, SODIUM LACTATE, POTASSIUM CHLORIDE, CALCIUM CHLORIDE 600; 310; 30; 20 MG/100ML; MG/100ML; MG/100ML; MG/100ML
INJECTION, SOLUTION INTRAVENOUS CONTINUOUS
Status: ACTIVE | OUTPATIENT
Start: 2018-11-14 | End: 2018-11-15

## 2018-11-14 RX ORDER — OXYTOCIN/RINGER'S LACTATE 20/1000 ML
333 PLASTIC BAG, INJECTION (ML) INTRAVENOUS CONTINUOUS
Status: DISCONTINUED | OUTPATIENT
Start: 2018-11-14 | End: 2018-11-14

## 2018-11-14 RX ORDER — INSULIN ASPART 100 [IU]/ML
22 INJECTION, SOLUTION INTRAVENOUS; SUBCUTANEOUS
Status: DISCONTINUED | OUTPATIENT
Start: 2018-11-14 | End: 2018-11-14

## 2018-11-14 RX ORDER — LIDOCAINE HYDROCHLORIDE AND EPINEPHRINE 15; 5 MG/ML; UG/ML
INJECTION, SOLUTION EPIDURAL
Status: DISCONTINUED | OUTPATIENT
Start: 2018-11-14 | End: 2018-11-14

## 2018-11-14 RX ORDER — DOCUSATE SODIUM 100 MG/1
200 CAPSULE, LIQUID FILLED ORAL 2 TIMES DAILY
Status: DISCONTINUED | OUTPATIENT
Start: 2018-11-14 | End: 2018-11-17 | Stop reason: HOSPADM

## 2018-11-14 RX ORDER — ONDANSETRON 8 MG/1
8 TABLET, ORALLY DISINTEGRATING ORAL EVERY 8 HOURS PRN
Status: CANCELLED | OUTPATIENT
Start: 2018-11-14

## 2018-11-14 RX ORDER — SODIUM CHLORIDE, SODIUM LACTATE, POTASSIUM CHLORIDE, CALCIUM CHLORIDE 600; 310; 30; 20 MG/100ML; MG/100ML; MG/100ML; MG/100ML
INJECTION, SOLUTION INTRAVENOUS CONTINUOUS PRN
Status: DISCONTINUED | OUTPATIENT
Start: 2018-11-14 | End: 2018-11-14

## 2018-11-14 RX ORDER — OXYTOCIN/RINGER'S LACTATE 20/1000 ML
41.7 PLASTIC BAG, INJECTION (ML) INTRAVENOUS CONTINUOUS
Status: DISCONTINUED | OUTPATIENT
Start: 2018-11-14 | End: 2018-11-14

## 2018-11-14 RX ORDER — AMOXICILLIN 250 MG
1 CAPSULE ORAL NIGHTLY PRN
Status: DISCONTINUED | OUTPATIENT
Start: 2018-11-14 | End: 2018-11-17 | Stop reason: HOSPADM

## 2018-11-14 RX ORDER — OXYCODONE HYDROCHLORIDE 5 MG/1
10 TABLET ORAL EVERY 4 HOURS PRN
Status: ACTIVE | OUTPATIENT
Start: 2018-11-14 | End: 2018-11-15

## 2018-11-14 RX ORDER — HYDROCORTISONE 25 MG/G
CREAM TOPICAL 3 TIMES DAILY PRN
Status: DISCONTINUED | OUTPATIENT
Start: 2018-11-14 | End: 2018-11-17 | Stop reason: HOSPADM

## 2018-11-14 RX ORDER — SIMETHICONE 80 MG
1 TABLET,CHEWABLE ORAL EVERY 6 HOURS PRN
Status: DISCONTINUED | OUTPATIENT
Start: 2018-11-14 | End: 2018-11-17 | Stop reason: HOSPADM

## 2018-11-14 RX ORDER — SODIUM CITRATE AND CITRIC ACID MONOHYDRATE 334; 500 MG/5ML; MG/5ML
30 SOLUTION ORAL
Status: DISCONTINUED | OUTPATIENT
Start: 2018-11-14 | End: 2018-11-14

## 2018-11-14 RX ORDER — DIPHENHYDRAMINE HCL 25 MG
25 CAPSULE ORAL EVERY 4 HOURS PRN
Status: DISCONTINUED | OUTPATIENT
Start: 2018-11-14 | End: 2018-11-17 | Stop reason: HOSPADM

## 2018-11-14 RX ORDER — OXYTOCIN/RINGER'S LACTATE 20/1000 ML
41.65 PLASTIC BAG, INJECTION (ML) INTRAVENOUS CONTINUOUS
Status: ACTIVE | OUTPATIENT
Start: 2018-11-14 | End: 2018-11-15

## 2018-11-14 RX ORDER — INSULIN ASPART 100 [IU]/ML
10 INJECTION, SOLUTION INTRAVENOUS; SUBCUTANEOUS
Status: DISCONTINUED | OUTPATIENT
Start: 2018-11-15 | End: 2018-11-16

## 2018-11-14 RX ORDER — CHLOROPROCAINE HYDROCHLORIDE 20 MG/ML
INJECTION, SOLUTION EPIDURAL; INFILTRATION; INTRACAUDAL; PERINEURAL
Status: DISCONTINUED | OUTPATIENT
Start: 2018-11-14 | End: 2018-11-14

## 2018-11-14 RX ORDER — ACETAMINOPHEN 325 MG/1
650 TABLET ORAL EVERY 6 HOURS
Status: COMPLETED | OUTPATIENT
Start: 2018-11-14 | End: 2018-11-15

## 2018-11-14 RX ORDER — PHENYLEPHRINE HYDROCHLORIDE 10 MG/ML
INJECTION INTRAVENOUS
Status: DISCONTINUED | OUTPATIENT
Start: 2018-11-14 | End: 2018-11-14

## 2018-11-14 RX ORDER — HYDROCODONE BITARTRATE AND ACETAMINOPHEN 10; 325 MG/1; MG/1
1 TABLET ORAL EVERY 4 HOURS PRN
Status: DISCONTINUED | OUTPATIENT
Start: 2018-11-15 | End: 2018-11-17 | Stop reason: HOSPADM

## 2018-11-14 RX ADMIN — OXYTOCIN 1 UNITS: 10 INJECTION, SOLUTION INTRAMUSCULAR; INTRAVENOUS at 02:11

## 2018-11-14 RX ADMIN — CEFAZOLIN 3 G: 330 INJECTION, POWDER, FOR SOLUTION INTRAMUSCULAR; INTRAVENOUS at 02:11

## 2018-11-14 RX ADMIN — BUPIVACAINE HYDROCHLORIDE IN DEXTROSE 1.6 ML: 7.5 INJECTION, SOLUTION SUBARACHNOID at 02:11

## 2018-11-14 RX ADMIN — ONDANSETRON 4 MG: 2 INJECTION INTRAMUSCULAR; INTRAVENOUS at 02:11

## 2018-11-14 RX ADMIN — LIDOCAINE HYDROCHLORIDE,EPINEPHRINE BITARTRATE 3 ML: 15; .005 INJECTION, SOLUTION EPIDURAL; INFILTRATION; INTRACAUDAL; PERINEURAL at 02:11

## 2018-11-14 RX ADMIN — SODIUM CHLORIDE, SODIUM LACTATE, POTASSIUM CHLORIDE, AND CALCIUM CHLORIDE: .6; .31; .03; .02 INJECTION, SOLUTION INTRAVENOUS at 10:11

## 2018-11-14 RX ADMIN — PHENYLEPHRINE HYDROCHLORIDE 100 MCG: 10 INJECTION INTRAVENOUS at 02:11

## 2018-11-14 RX ADMIN — SODIUM CHLORIDE, SODIUM LACTATE, POTASSIUM CHLORIDE, AND CALCIUM CHLORIDE: 600; 310; 30; 20 INJECTION, SOLUTION INTRAVENOUS at 02:11

## 2018-11-14 RX ADMIN — CHLOROPROCAINE HYDROCHLORIDE 5 ML: 20 INJECTION, SOLUTION EPIDURAL; INFILTRATION; INTRACAUDAL; PERINEURAL at 03:11

## 2018-11-14 RX ADMIN — OXYTOCIN 2 UNITS: 10 INJECTION, SOLUTION INTRAMUSCULAR; INTRAVENOUS at 03:11

## 2018-11-14 RX ADMIN — Medication 0.15 MG: at 02:11

## 2018-11-14 RX ADMIN — ACETAMINOPHEN 1000 MG: 10 INJECTION, SOLUTION INTRAVENOUS at 02:11

## 2018-11-14 RX ADMIN — KETOROLAC TROMETHAMINE 30 MG: 30 INJECTION, SOLUTION INTRAMUSCULAR at 09:11

## 2018-11-14 RX ADMIN — FENTANYL CITRATE 10 MCG: 50 INJECTION, SOLUTION INTRAMUSCULAR; INTRAVENOUS at 02:11

## 2018-11-14 RX ADMIN — SODIUM CHLORIDE, SODIUM LACTATE, POTASSIUM CHLORIDE, AND CALCIUM CHLORIDE: .6; .31; .03; .02 INJECTION, SOLUTION INTRAVENOUS at 01:11

## 2018-11-14 RX ADMIN — LIDOCAINE HYDROCHLORIDE,EPINEPHRINE BITARTRATE 2 ML: 15; .005 INJECTION, SOLUTION EPIDURAL; INFILTRATION; INTRACAUDAL; PERINEURAL at 03:11

## 2018-11-14 RX ADMIN — ACETAMINOPHEN 650 MG: 325 TABLET, FILM COATED ORAL at 09:11

## 2018-11-14 RX ADMIN — KETOROLAC TROMETHAMINE 30 MG: 30 INJECTION, SOLUTION INTRAMUSCULAR; INTRAVENOUS at 02:11

## 2018-11-14 RX ADMIN — OXYTOCIN 2 UNITS: 10 INJECTION, SOLUTION INTRAMUSCULAR; INTRAVENOUS at 02:11

## 2018-11-14 RX ADMIN — OXYTOCIN 1 UNITS: 10 INJECTION, SOLUTION INTRAMUSCULAR; INTRAVENOUS at 03:11

## 2018-11-14 RX ADMIN — OXYTOCIN 3 UNITS: 10 INJECTION, SOLUTION INTRAMUSCULAR; INTRAVENOUS at 03:11

## 2018-11-14 RX ADMIN — SODIUM CITRATE AND CITRIC ACID MONOHYDRATE 30 ML: 500; 334 SOLUTION ORAL at 01:11

## 2018-11-14 RX ADMIN — DOCUSATE SODIUM 200 MG: 100 CAPSULE, LIQUID FILLED ORAL at 09:11

## 2018-11-14 RX ADMIN — PHENYLEPHRINE HYDROCHLORIDE 200 MCG: 10 INJECTION INTRAVENOUS at 02:11

## 2018-11-14 RX ADMIN — SODIUM CITRATE AND CITRIC ACID MONOHYDRATE 30 ML: 500; 334 SOLUTION ORAL at 10:11

## 2018-11-14 RX ADMIN — ONDANSETRON HYDROCHLORIDE 4 MG: 2 INJECTION, SOLUTION INTRAMUSCULAR; INTRAVENOUS at 07:11

## 2018-11-14 NOTE — ANESTHESIA PROCEDURE NOTES
CSE    Patient location during procedure: OR  Start time: 11/14/2018 1:49 PM  Timeout: 11/14/2018 1:48 PM  End time: 11/14/2018 2:06 PM  Staffing  Anesthesiologist: Patricia Byrne MD  Resident/CRNA: Rufus Nolan MD  Preanesthetic Checklist  Completed: patient identified, surgical consent, pre-op evaluation, timeout performed, IV checked, risks and benefits discussed and monitors and equipment checked  CSE  Patient position: sitting  Prep: ChloraPrep  Patient monitoring: heart rate, cardiac monitor, continuous pulse ox and frequent blood pressure checks  Approach: midline  Spinal Needle  Needle type: Berna Sree   Needle gauge: 25 G  Needle length: 5 in  Epidural Needle  Injection technique: ANA M saline  Needle type: Tuohy   Needle gauge: 17 G  Needle length: 5 in  Needle insertion depth: 9.5 cm  Location: L4-5  Needle localization: anatomical landmarks  Catheter  Catheter type: Aurora Feint  Catheter size: 19 G  Catheter at skin depth: 14 cm  Test dose: lidocaine 1.5% with Epi 1-to-200,000  Assessment  Sensory level: T4   Dermatomal levels determined by pinch or prick  Intrathecal Medications:  administered: primary anesthetic mcg of

## 2018-11-14 NOTE — H&P
HISTORY AND PHYSICAL                                                OBSTETRICS          Subjective:       Stephanie Russell is a 43 y.o.  female with IUP at 37w0d weeks gestation who presents for scheduled Repeat  section. Patient has a history of  x3, including a Classical section for transverse presentation. She has type 2 diabetes mellitus and is currently on a regimen of NPH 70 in the AM and regular //. Per MFM scan on , patient has polyhydramnios and fetus has continued to change presentation this month. This IUP is also complicated by AMA, morbid obesity, and mild recurrent major depression.    Review of Systems   Constitutional: Negative for chills and fever.   HENT: Negative for congestion.    Eyes: Negative for blurred vision and double vision.   Respiratory: Negative for cough, shortness of breath and wheezing.    Cardiovascular: Positive for leg swelling. Negative for chest pain and palpitations.   Gastrointestinal: Negative for diarrhea, nausea and vomiting.   Genitourinary: Negative for dysuria and urgency.   Musculoskeletal: Negative for back pain.   Skin: Negative for rash.   Neurological: Negative for dizziness, weakness and headaches.   Psychiatric/Behavioral: Positive for depression.     PMHx:   Past Medical History:   Diagnosis Date    Diabetes mellitus        PSHx:   Past Surgical History:   Procedure Laterality Date     SECTION      DELIVERY- SECTION Bilateral 3/28/2017    Performed [facility] at [facility] L[facility]D    ECTOPIC PREGNANCY SURGERY         All: Review of patient's allergies indicates:  No Known Allergies    Meds:   Medications Prior to Admission   Medication Sig Dispense Refill Last Dose    insulin NPH (NOVOLIN N) 100 unit/mL injection Inject 70 Units into the skin before breakfast.    Taking    insulin regular 100 unit/mL Inj injection Inject 18 Units into the skin 3 (three) times daily before meals.     Taking    NESTABS 32-1,000 mg-mcg Tab    Taking    terconazole (TERAZOL 7) 0.4 % Crea Place 1 applicator vaginally once daily. 45 g 0 Not Taking       SH:   Social History     Socioeconomic History    Marital status:      Spouse name: Not on file    Number of children: Not on file    Years of education: Not on file    Highest education level: Not on file   Social Needs    Financial resource strain: Not on file    Food insecurity - worry: Not on file    Food insecurity - inability: Not on file    Transportation needs - medical: Not on file    Transportation needs - non-medical: Not on file   Occupational History    Not on file   Tobacco Use    Smoking status: Former Smoker     Types: Cigarettes    Smokeless tobacco: Never Used   Substance and Sexual Activity    Alcohol use: No    Drug use: No    Sexual activity: Yes   Other Topics Concern    Not on file   Social History Narrative    Not on file       FH:   Family History   Problem Relation Age of Onset    Hypertension Maternal Grandfather     Pacemaker/defibrilator Maternal Grandfather         66    Congenital heart disease Son         complete av canal s/p repair    Down syndrome Son     Heart attacks under age 50 Neg Hx     Arrhythmia Neg Hx        OBHx:   Obstetric History       T3      L3     SAB2   TAB0   Ectopic1   Multiple0   Live Births3       # Outcome Date GA Lbr Nikhil/2nd Weight Sex Delivery Anes PTL Lv   8 Current            7 2017           6 Term 17 38w0d  3.714 kg (8 lb 3 oz) M CS-Classical Spinal N MITCHELL      Name: Edel Russell      Apgar1:  4                Apgar5: 9   5 2016   0.4 kg (14.1 oz) U   Y FD      Name: Benjamin   4     0.5 kg (1 lb 1.6 oz) U   Y FD      Name: Lloyd      Complications: Nuchal cord   3 Term    3.175 kg (7 lb) F CS-LTranv         Name: Annalee   2 Ectopic            1 Term    4.649 kg (10 lb 4 oz) M CS-Unspec  N MITCHELL      Name: Fabien       "Complications: Failure to progress in labor          Objective:       /73   Pulse 81   Temp 97.1 °F (36.2 °C)   Resp 20   Ht 5' 10" (1.778 m)   Wt 124.5 kg (274 lb 7.6 oz)   LMP 2018   SpO2 98%   Breastfeeding? Yes   BMI 39.38 kg/m²     Vitals:    18 1021 18 1022   BP: 136/73    Pulse: 88 81   Resp:  20   Temp:  97.1 °F (36.2 °C)   SpO2:  98%   Weight:  124.5 kg (274 lb 7.6 oz)   Height:  5' 10" (1.778 m)       General:   alert, appears stated age and cooperative   Lungs:   clear to auscultation bilaterally   Heart:   regular rate and rhythm, S1, S2 normal, no murmur, click, rub or gallop   Abdomen:  soft, non-tender; bowel sounds normal; no masses,  no organomegaly   Extremities positive edema, negative erythema   FHT: 140 Cat 1 (reassuring)                 TOCO: No CTX   Presentations: transverse by ultrasound   Cervix: deferred   Neurological: alert and oriented  Skin: no rash or wound  Psych: mood and affect appropriate    Lab Review  Blood Type B POS  GBBS: negative  Rubella: Immune  RPR: NR  HIV: negative  HepB: negative    Assessment:     43 y.o.  female with IUP at 37w0d complicated by AMA, morbid obesity, depression, h/o c/s x3, history of Classical c/s, and type 2 DM admitted to L&D for scheduled Repeat  section.    Active Hospital Problems    Diagnosis  POA    *H/O  section - classical, needs repeat 36-37 weeks [Z98.891]  Not Applicable    History of classical  section [Z98.891]  Not Applicable    AMA (advanced maternal age) multigravida 35+ - needs u/s at 32 weeks [O09.529]  Yes    Down syndrome in child of prior pregnancy, currently pregnant [O09.299]  Not Applicable    H/O congenital cardiac septal defect - peds card consult [Z87.74]  Not Applicable    Insulin controlled gestational diabetes mellitus (GDM) during pregnancy [O24.414]  Not Applicable     ekg  P/c  cmp  Eye  Podiatry  Hem a1c  pnt      Obesity complicating pregnancy " [O99.210]  Yes     Serial growth        Resolved Hospital Problems   No resolved problems to display.          Plan:   1. Repeat  section:  - Risks, benefits, alternatives and possible complications have been discussed in detail with the patient.   - Consents signed and to chart  - Admit to Labor and Delivery unit  - Epidural per Anesthesia  - Draw CBC, T&S  - Ancef 3g OCTOR  - To OR for C/S. Case Request is in.   - Notify Staff  - Ultrasound performed, infant in transverse position, head on maternal left.     2. History of  section x3, including Classical:  - Reviewed last op note, plan to make vertical incision  - Incision location and risks to bowel and bladder discussed with patient who voiced understanding    3. Type 2 Diabetes mellitus:  -  on admission  - Last A1C on 18 was 7.3  - NPH 70 in AM  - Regular insulin //X    4. Morbid obesity:  - BMI 38  - Encourage ambulation and use of TEDs/SCDs    5. Advanced maternal age    6. History of infant with Down's Syndrome and congenital heart defect     7. Mild recurrent major depression      Postpartum hemorrhage risk: medium        Olivia Esparza MD  OBGYN, PGY-1    Admitted for repeat C/S  I have reviewed the resident's note, evaluated the patient and agree with the diagnosis and management plan

## 2018-11-14 NOTE — ANESTHESIA PREPROCEDURE EVALUATION
Stephanie Marley Russell is a 43 y.o. female with IUP at 37/0 presenting for C/S  #4 delivery today. Indication is repeat C/S with initial indication being failure to progress (classical incision). No issues with previous neuraxial. Pregnancy complicated by DM-II, AMA, and obesity.     OB History    Para Term  AB Living   8 4 3 1 3 3   SAB TAB Ectopic Multiple Live Births   2   1 0 3      # Outcome Date GA Lbr Nikhil/2nd Weight Sex Delivery Anes PTL Lv   8 Current            7 SAB 2017           6 Term 17 38w0d  3.714 kg (8 lb 3 oz) M CS-Classical Spinal N MITCHELL   5 SAB    0.4 kg (14.1 oz) U   Y FD   4     0.5 kg (1 lb 1.6 oz) U   Y FD      Complications: Nuchal cord   3 Term    3.175 kg (7 lb) F CS-LTranv      2 Ectopic            1 Term    4.649 kg (10 lb 4 oz) M CS-Unspec  N MITCHELL      Complications: Failure to progress in labor          Wt Readings from Last 1 Encounters:   18 1022 124.5 kg (274 lb 7.6 oz)       BP Readings from Last 3 Encounters:   18 136/73   18 110/68   18 114/74       Patient Active Problem List   Diagnosis    AMA (advanced maternal age) multigravida 35+ - needs u/s at 32 weeks    Type 2 diabetes mellitus    Down syndrome in child of prior pregnancy, currently pregnant    H/O  section - classical, needs repeat 36-37 weeks    H/O congenital cardiac septal defect - peds card consult    Obesity    Insulin controlled gestational diabetes mellitus (GDM) during pregnancy    Obesity complicating pregnancy    Mild recurrent major depression    History of classical  section       Past Surgical History:   Procedure Laterality Date     SECTION      DELIVERY- SECTION Bilateral 3/28/2017    Performed [facility] at [facility] L&D    ECTOPIC PREGNANCY SURGERY         Social History     Socioeconomic History    Marital status:      Spouse name: Not on file    Number of  children: Not on file    Years of education: Not on file    Highest education level: Not on file   Social Needs    Financial resource strain: Not on file    Food insecurity - worry: Not on file    Food insecurity - inability: Not on file    Transportation needs - medical: Not on file    Transportation needs - non-medical: Not on file   Occupational History    Not on file   Tobacco Use    Smoking status: Former Smoker     Types: Cigarettes    Smokeless tobacco: Never Used   Substance and Sexual Activity    Alcohol use: No    Drug use: No    Sexual activity: Yes   Other Topics Concern    Not on file   Social History Narrative    Not on file         Chemistry        Component Value Date/Time     (L) 09/11/2018 1105    K 3.7 09/11/2018 1105     09/11/2018 1105    CO2 21 (L) 09/11/2018 1105    BUN 9 09/11/2018 1105    CREATININE 0.6 09/11/2018 1105     (H) 09/11/2018 1105        Component Value Date/Time    CALCIUM 8.9 09/11/2018 1105    ALKPHOS 58 09/11/2018 1105    AST 17 09/11/2018 1105    ALT 14 09/11/2018 1105    BILITOT 0.2 09/11/2018 1105    ESTGFRAFRICA >60.0 09/11/2018 1105    EGFRNONAA >60.0 09/11/2018 1105            Lab Results   Component Value Date    WBC 7.10 11/14/2018    HGB 12.9 11/14/2018    HCT 37.3 11/14/2018    MCV 80 (L) 11/14/2018     11/14/2018       No results for input(s): PT, INR, PROTIME, APTT in the last 72 hours.            Anesthesia Evaluation    I have reviewed the Patient Summary Reports.     I have reviewed the Medications.     Review of Systems  Anesthesia Hx:  No problems with previous Anesthesia  History of prior surgery of interest to airway management or planning: Previous anesthesia: General, Epidural Denies Family Hx of Anesthesia complications.   Denies Personal Hx of Anesthesia complications.   Hematology/Oncology:  Hematology Normal   Oncology Normal     EENT/Dental:EENT/Dental Normal   Cardiovascular:  Cardiovascular Normal      Pulmonary:  Pulmonary Normal    Renal/:  Renal/ Normal     Hepatic/GI:  Hepatic/GI Normal    Musculoskeletal:  Musculoskeletal Normal    Neurological:  Neurology Normal    Endocrine:   Diabetes, poorly controlled, type 2, using insulin    Psych:  Psychiatric Normal           Physical Exam  General:  Well nourished, Obesity    Airway/Jaw/Neck:  Airway Findings: Mouth Opening: Normal Tongue: Normal  General Airway Assessment: Adult  Mallampati: IV  Improves to III with phonation.  TM Distance: Normal, at least 6 cm      Dental:  Dental Findings: In tact   Chest/Lungs:  Chest/Lungs Findings: Normal Respiratory Rate     Heart/Vascular:  Heart Findings: Rate: Normal  Rhythm: Regular Rhythm  Vascular Findings:  Vascular Access: Peripheral IV(s)        Mental Status:  Mental Status Findings:  Cooperative, Alert and Oriented         Anesthesia Plan  Type of Anesthesia, risks & benefits discussed:  Anesthesia Type:  CSE, epidural, general, spinal  Patient's Preference:   Intra-op Monitoring Plan:   Intra-op Monitoring Plan Comments:   Post Op Pain Control Plan:   Post Op Pain Control Plan Comments:   Induction:    Beta Blocker:  Patient is not currently on a Beta-Blocker (No further documentation required).       Informed Consent: Patient understands risks and agrees with Anesthesia plan.  Questions answered. Anesthesia consent signed with patient.  ASA Score: 2     Day of Surgery Review of History & Physical:    H&P update referred to the surgeon.         Ready For Surgery From Anesthesia Perspective.

## 2018-11-14 NOTE — TRANSFER OF CARE
Anesthesia Transfer of Care Note    Patient: Stephanie Marley Russell    Procedure(s) Performed: Procedure(s) (LRB):   SECTION (N/A)    Patient location: Labor and Delivery    Transport from OR: Transported from OR on room air with adequate spontaneous ventilation    Post pain: adequate analgesia    Post assessment: no apparent anesthetic complications and tolerated procedure well    Post vital signs: stable    Level of consciousness: awake    Nausea/Vomiting: no nausea/vomiting    Complications: none    Transfer of care protocol was followed      Last vitals:   HR-74  BP-114/59  RR-10  SpO2-100%

## 2018-11-14 NOTE — L&D DELIVERY NOTE
[facility]   Section   Operative Note    SUMMARY     Date of Procedure: 2018     Procedure: Procedure(s) (LRB):   SECTION (N/A)    Surgeon(s) and Role:     * Frederick Etienne III, MD - Primary    Assisting Surgeon:   Brad Esparza    Pre-Operative Diagnosis:   1. Obesity  2. Hx of c/s x3 (CCS x1)  3. Hx of ex lap for ectopic rupture  4. IDDM2   5. 37 week gestation  6. AMA  7. Hx of wound breakdown/infection s/p c/s  8. Breech presentation    Post-Operative Diagnosis: Post-Op Diagnosis Codes:  1. Same, s/p repeat c/s x4    Anesthesia: Spinal/Epidural    Technical Procedures Used:            Complications: No    Blood Loss: 870 mL       Patient presented for scheduled repeat  section. Patient counseled on need for vertical midline given op note of prior c/s reporting dense scarring through pfannensteil with bowel adhesion. Patient voiced understanding. Patient taken to OR. Epidural placed, montes placed. Patient prepped and draped. Allis test negative. Time Out Taken. Vertical midline incision made through old vertical ex lap from ruptured ectopic. Incision taken down to level of fascia with surgical knife. Bovie used to maintain hemostasis. Fasciotomy made with surgical knife. Palpation under fascia showed no bowel immediate to start of fasciotomy. The fascia was opened cephalad with celis scissors. There was bowel, peritoneum and omentum attached to left anterior abdominal wall. Once the fascia was completely opened omentum and peritoneum removed from anterior abdominal wall with bovie cautery, careful to ensure bowel was free from surgical field. Once adhesions were taken down Jef O-Ring was put in place. The inside ring palpated to ensure free of bowel and the ring was tightened down to expose the uterus. Uterus palpated and head re confirmed maternal left. Low transverse uterine incision was made and hysterotomy extended with manual retraction. The  left foot of the fetus was immediately available and grabbed. Once the right foot was secured both feet elevated to level of hysterotomy and fetus was delivered breech uncomplicated. The cord was cut after 30 seconds of delayed cord clamping. Placenta manually retrieved. Uterus cleaned of all clots and membranes. Hysterotomy closed with running locked 1 Chromic. Bleeding persisted form serosa and T sutures used to create hemostasis. Multiple rounds of irrigation and bovie along with one more T suture and hemostasis ensured. Once irrigation and clots removed the O ring was removed and enrike was placed. Hemostasis again observed. Peritoneum closed with running vicryl. Fascia closed with running Loop 1 PDS. Subcutaneous fat layers was >5 cm deep. Irrigated and made hemsotatic with bovie. Subcutaneous fat layer closed in 3 layers. Skin closed with 4-0 monocryl      Specimens:   Specimen (12h ago, onward)    None          Condition: Good    Disposition: PACU - hemodynamically stable.    Attestation: Good         Delivery Information for  Pedro Brown Russell    Birth information:  YOB: 2017   Time of birth: 2:41 PM   Sex: male   Head Delivery Date/Time: 2018  2:41 PM   Delivery type: , Classical   Gestational Age: 37w0d    Delivery Providers    Delivering clinician:  Frederick Etienne III, MD   Provider Role    Brad Rogel MD Resident    Olivia Esparza MD Resident    Christine Oliver, SHELBY Registered Nurse    Natalia Galvez, [facility] Surgical Tech            Measurements    Weight:  3799 g  Length:  52.7 cm  Head circumference:  36.2 cm  Chest circumference:  35.6 cm         Apgars    Living status:  Living  Apgars:   1 min.:   5 min.:   10 min.:   15 min.:   20 min.:     Skin color:   1  1       Heart rate:   2  2       Reflex irritability:   2  2       Muscle tone:   2  2       Respiratory effort:   2  2       Total:   9  9       Apgars assigned by:  OMERO RYAN         Operative  Delivery    Forceps attempted?:  No  Vacuum extractor attempted?:  No         Shoulder Dystocia    Shoulder dystocia present?:  No           Presentation    Presentation:  Transverse  Position:  Left           Interventions/Resuscitation    Method:  Bulb Suctioning, Tactile Stimulation       Cord    Vessels:  3 vessels  Complications:  None  Delayed Cord Clamping?:  No  Cord Clamped Date/Time:  2018  2:42 PM  Cord Blood Disposition:  Sent with Baby  Gases Sent?:  No  Stem Cell Collection (by MD):  No       Placenta    Placenta delivery date/time:  2018 1444  Placenta removal:  Manual removal  Placenta appearance:  Intact  Placenta disposition:  discarded           Labor Events:       labor: No     Labor Onset Date/Time:         Dilation Complete Date/Time:         Start Pushing Date/Time:       Rupture Date/Time:              Rupture type:           Fluid Amount:        Fluid Color:        Fluid Odor:        Membrane Status (PeriCalm):        Rupture Date/Time (PeriCalm):        Fluid Amount (PeriCalm):        Fluid Color (PeriCalm):         steroids: None     Antibiotics given for GBS: No     Induction: none     Indications for induction:        Augmentation:       Indications for augmentation:       Labor complications: None     Additional complications:          Cervical ripening:                     Delivery:      Episiotomy: None     Indication for Episiotomy:       Perineal Lacerations: None Repaired:      Periurethral Laceration: none Repaired:     Labial Laceration: none Repaired:     Sulcus Laceration: none Repaired:     Vaginal Laceration: No Repaired:     Cervical Laceration: No Repaired:     Repair suture:       Repair # of packets:       Vaginal delivery QBL (mL): 0      QBL (mL): 870     Combined Blood Loss (mL): 870     Vaginal Sweep Performed: No     Surgicount Correct: Yes       Other providers:       Anesthesia    Method:  Epidural, Spinal           Details (if applicable):  Trial of Labor No    Categorization: Repeat    Priority: Routine   Indications for : Repeat Section   Incision Type: low transverse     Additional  information:  Forceps:    Vacuum:    Breech:    Observed anomalies    Other (Comments):             There was a problem of adhesions of the omentum and bowel with the anterior fascia that had to be dissected prior to visualizing the uterus.  I was present for and participated in the entire caesarean section procedure. procedure.

## 2018-11-15 LAB
BASOPHILS # BLD AUTO: 0.01 K/UL
BASOPHILS NFR BLD: 0.1 %
DIFFERENTIAL METHOD: ABNORMAL
EOSINOPHIL # BLD AUTO: 0.1 K/UL
EOSINOPHIL NFR BLD: 0.7 %
ERYTHROCYTE [DISTWIDTH] IN BLOOD BY AUTOMATED COUNT: 15.1 %
HCT VFR BLD AUTO: 36.3 %
HGB BLD-MCNC: 12 G/DL
LYMPHOCYTES # BLD AUTO: 1.1 K/UL
LYMPHOCYTES NFR BLD: 11.4 %
MCH RBC QN AUTO: 27.3 PG
MCHC RBC AUTO-ENTMCNC: 33.1 G/DL
MCV RBC AUTO: 83 FL
MONOCYTES # BLD AUTO: 1 K/UL
MONOCYTES NFR BLD: 9.7 %
NEUTROPHILS # BLD AUTO: 7.8 K/UL
NEUTROPHILS NFR BLD: 78 %
PLATELET # BLD AUTO: 211 K/UL
PMV BLD AUTO: 10.1 FL
POCT GLUCOSE: 130 MG/DL (ref 70–110)
POCT GLUCOSE: 132 MG/DL (ref 70–110)
POCT GLUCOSE: 145 MG/DL (ref 70–110)
POCT GLUCOSE: 146 MG/DL (ref 70–110)
POCT GLUCOSE: 159 MG/DL (ref 70–110)
POCT GLUCOSE: 214 MG/DL (ref 70–110)
POCT GLUCOSE: 233 MG/DL (ref 70–110)
RBC # BLD AUTO: 4.39 M/UL
WBC # BLD AUTO: 9.95 K/UL

## 2018-11-15 PROCEDURE — 25000003 PHARM REV CODE 250: Performed by: ANESTHESIOLOGY

## 2018-11-15 PROCEDURE — 25000003 PHARM REV CODE 250: Performed by: STUDENT IN AN ORGANIZED HEALTH CARE EDUCATION/TRAINING PROGRAM

## 2018-11-15 PROCEDURE — 11000001 HC ACUTE MED/SURG PRIVATE ROOM

## 2018-11-15 PROCEDURE — 63600175 PHARM REV CODE 636 W HCPCS: Performed by: STUDENT IN AN ORGANIZED HEALTH CARE EDUCATION/TRAINING PROGRAM

## 2018-11-15 PROCEDURE — 85025 COMPLETE CBC W/AUTO DIFF WBC: CPT

## 2018-11-15 PROCEDURE — 36415 COLL VENOUS BLD VENIPUNCTURE: CPT

## 2018-11-15 PROCEDURE — 63600175 PHARM REV CODE 636 W HCPCS: Performed by: ANESTHESIOLOGY

## 2018-11-15 PROCEDURE — 99232 SBSQ HOSP IP/OBS MODERATE 35: CPT | Mod: ,,, | Performed by: OBSTETRICS & GYNECOLOGY

## 2018-11-15 RX ORDER — HYDROCODONE BITARTRATE AND ACETAMINOPHEN 5; 325 MG/1; MG/1
1 TABLET ORAL EVERY 4 HOURS PRN
Qty: 20 TABLET | Refills: 0 | Status: SHIPPED | OUTPATIENT
Start: 2018-11-15 | End: 2018-12-18

## 2018-11-15 RX ORDER — INSULIN ASPART 100 [IU]/ML
10 INJECTION, SOLUTION INTRAVENOUS; SUBCUTANEOUS
Status: DISCONTINUED | OUTPATIENT
Start: 2018-11-16 | End: 2018-11-17 | Stop reason: HOSPADM

## 2018-11-15 RX ORDER — NALBUPHINE HYDROCHLORIDE 10 MG/ML
2.5 INJECTION, SOLUTION INTRAMUSCULAR; INTRAVENOUS; SUBCUTANEOUS
Status: DISCONTINUED | OUTPATIENT
Start: 2018-11-15 | End: 2018-11-17 | Stop reason: HOSPADM

## 2018-11-15 RX ORDER — IBUPROFEN 600 MG/1
600 TABLET ORAL EVERY 6 HOURS
Qty: 30 TABLET | Refills: 1 | Status: SHIPPED | OUTPATIENT
Start: 2018-11-16 | End: 2018-12-18

## 2018-11-15 RX ADMIN — DOCUSATE SODIUM 200 MG: 100 CAPSULE, LIQUID FILLED ORAL at 08:11

## 2018-11-15 RX ADMIN — IBUPROFEN 600 MG: 600 TABLET ORAL at 05:11

## 2018-11-15 RX ADMIN — ACETAMINOPHEN 650 MG: 325 TABLET, FILM COATED ORAL at 05:11

## 2018-11-15 RX ADMIN — DIPHENHYDRAMINE HYDROCHLORIDE 25 MG: 25 CAPSULE ORAL at 08:11

## 2018-11-15 RX ADMIN — HUMAN INSULIN 35 UNITS: 100 INJECTION, SUSPENSION SUBCUTANEOUS at 05:11

## 2018-11-15 RX ADMIN — ACETAMINOPHEN 650 MG: 325 TABLET, FILM COATED ORAL at 12:11

## 2018-11-15 RX ADMIN — HYDROCODONE BITARTRATE AND ACETAMINOPHEN 1 TABLET: 5; 325 TABLET ORAL at 09:11

## 2018-11-15 RX ADMIN — INSULIN ASPART 11 UNITS: 100 INJECTION, SOLUTION INTRAVENOUS; SUBCUTANEOUS at 05:11

## 2018-11-15 RX ADMIN — INSULIN ASPART 10 UNITS: 100 INJECTION, SOLUTION INTRAVENOUS; SUBCUTANEOUS at 12:11

## 2018-11-15 RX ADMIN — DIPHENHYDRAMINE HYDROCHLORIDE 25 MG: 25 CAPSULE ORAL at 12:11

## 2018-11-15 RX ADMIN — NALBUPHINE HYDROCHLORIDE 2.5 MG: 10 INJECTION, SOLUTION INTRAMUSCULAR; INTRAVENOUS; SUBCUTANEOUS at 09:11

## 2018-11-15 RX ADMIN — KETOROLAC TROMETHAMINE 30 MG: 30 INJECTION, SOLUTION INTRAMUSCULAR at 12:11

## 2018-11-15 RX ADMIN — INSULIN ASPART 10 UNITS: 100 INJECTION, SOLUTION INTRAVENOUS; SUBCUTANEOUS at 08:11

## 2018-11-15 RX ADMIN — KETOROLAC TROMETHAMINE 30 MG: 30 INJECTION, SOLUTION INTRAMUSCULAR at 05:11

## 2018-11-15 RX ADMIN — DIPHENHYDRAMINE HYDROCHLORIDE 25 MG: 25 CAPSULE ORAL at 04:11

## 2018-11-15 RX ADMIN — NALBUPHINE HYDROCHLORIDE 2.5 MG: 10 INJECTION, SOLUTION INTRAMUSCULAR; INTRAVENOUS; SUBCUTANEOUS at 01:11

## 2018-11-15 NOTE — PROGRESS NOTES
Pt transferred to mbu with infant, report received from L&D nurse, pt sitting up in bed with infant skin to skin, oriented to room, dinner ordered by pt., no needs expressed at this time, CN at bedside assisting mother with breastfeeding.

## 2018-11-15 NOTE — ANESTHESIA POSTPROCEDURE EVALUATION
"Anesthesia Post Evaluation    Patient: Stephanie Marley Russell    Procedure(s) Performed: Procedure(s) (LRB):   SECTION (N/A)    Final Anesthesia Type: CSE  Patient location during evaluation: labor & delivery  Patient participation: Yes- Able to Participate  Level of consciousness: awake and alert and oriented  Post-procedure vital signs: reviewed and stable  Pain management: adequate  Airway patency: patent  PONV status at discharge: No PONV  Anesthetic complications: no      Cardiovascular status: blood pressure returned to baseline  Respiratory status: unassisted  Hydration status: euvolemic  Follow-up not needed.      Pruritis- Nubain PRN. Will follow up.     Visit Vitals  /60   Pulse 87   Temp 36.8 °C (98.3 °F) (Oral)   Resp 18   Ht 5' 10" (1.778 m)   Wt 124.5 kg (274 lb 7.6 oz)   LMP 2018   SpO2 97%   Breastfeeding? Yes   BMI 39.38 kg/m²       Pain/Vincent Score: Pain Rating Prior to Med Admin: 5 (11/15/2018  9:20 AM)  Pain Rating Post Med Admin: 0 (2018 10:00 PM)        "

## 2018-11-15 NOTE — PROGRESS NOTES
POSTPARTUM PROGRESS NOTE     Stephanie Russell is a 43 y.o. female POD #1 status post Repeat  section at 37w0d in a pregnancy complicated by DM2 and maternal obesity. Patient is doing well this morning. She denies nausea, vomiting, fever or chills.  Patient reports mild abdominal pain that is well relieved by oral pain medications. Lochia is mild and decreasing. Montes taken out this AM, patient yet to spontaneously void. Has not ambulated yet given montes was in place last night. She has passed flatus, and has not had BM.  Patient does plan to breast feed. Defer to post partum visit with Dr. Nicholas for contraception. She desires circumcision - consents signed this AM.     Objective:       Temp:  [97.1 °F (36.2 °C)-98.7 °F (37.1 °C)] 98.7 °F (37.1 °C)  Pulse:  [68-88] 86  Resp:  [16-20] 18  SpO2:  [96 %-100 %] 96 %  BP: (107-141)/(56-75) 141/61    General:   alert, appears stated age and cooperative   Lungs:   clear to auscultation bilaterally   Heart:   regular rate and rhythm, S1, S2 normal, no murmur, click, rub or gallop   Abdomen:  soft, non-tender; bowel sounds normal; no masses,  no organomegaly   Uterus:  firm located at the umblicus.        Incision: Bandage in place, clean, dry and intact   Extremities: peripheral pulses normal, no pedal edema, no clubbing or cyanosis     Lab Review  Recent Results (from the past 4 hour(s))   POCT glucose    Collection Time: 11/15/18  5:27 AM   Result Value Ref Range    POCT Glucose 146 (H) 70 - 110 mg/dL   CBC auto differential    Collection Time: 11/15/18  5:37 AM   Result Value Ref Range    WBC 9.95 3.90 - 12.70 K/uL    RBC 4.39 4.00 - 5.40 M/uL    Hemoglobin 12.0 12.0 - 16.0 g/dL    Hematocrit 36.3 (L) 37.0 - 48.5 %    MCV 83 82 - 98 fL    MCH 27.3 27.0 - 31.0 pg    MCHC 33.1 32.0 - 36.0 g/dL    RDW 15.1 (H) 11.5 - 14.5 %    Platelets 211 150 - 350 K/uL    MPV 10.1 9.2 - 12.9 fL    Gran # (ANC) 7.8 (H) 1.8 - 7.7 K/uL    Lymph # 1.1 1.0 - 4.8 K/uL    Mono #  1.0 0.3 - 1.0 K/uL    Eos # 0.1 0.0 - 0.5 K/uL    Baso # 0.01 0.00 - 0.20 K/uL    Gran% 78.0 (H) 38.0 - 73.0 %    Lymph% 11.4 (L) 18.0 - 48.0 %    Mono% 9.7 4.0 - 15.0 %    Eosinophil% 0.7 0.0 - 8.0 %    Basophil% 0.1 0.0 - 1.9 %    Differential Method Automated        I/O    Intake/Output Summary (Last 24 hours) at 11/15/2018 0641  Last data filed at 11/15/2018 0600  Gross per 24 hour   Intake 1500 ml   Output 2720 ml   Net -1220 ml        Assessment:     Patient Active Problem List   Diagnosis    AMA (advanced maternal age) multigravida 35+ - needs u/s at 32 weeks    Type 2 diabetes mellitus    Down syndrome in child of prior pregnancy, currently pregnant    H/O  section - classical, needs repeat 36-37 weeks    H/O congenital cardiac septal defect - peds card consult    Obesity    Mild recurrent major depression    History of classical  section    Status post  section        Plan:   1. Postpartum care:  - Patient doing well. Continue routine management and advances.  - Continue PO pain meds. Pain well controlled.  - Heme: H/h: 13/37>12/36.3  - Encourage ambulation  - Circumcision - desired, consents signed this AM  - Contraception defer to post partum visit with Dr. Nicholas  - Lactation consult PRN    2. DM2  - Asymptomatic  - POCT AM glucose: 146  - Currently: NPH 35u AM, regular 10/10/11  - Continue to monitor    3. Obesity  - BMI: 39.3  - Encourage TEDs/SCDs and encourage ambulation      Dispo: As patient meets milestones, will plan to discharge POD#3-4    Nica Moody MD  OB/GYN  PGY-1

## 2018-11-15 NOTE — DISCHARGE INSTRUCTIONS
Breastfeeding Discharge Instructions       Feed the baby at the earliest sign of hunger or comfort  o Hands to mouth, sucking motions  o Rooting or searching for something to suck on  o Dont wait for crying - it is a sign of distress     The feedings may be 8-12 times per 24hrs and will not follow a schedule   Avoid pacifiers and bottles for the first 4 weeks   Alternate the breast you start the feeding with, or start with the breast that feels the fullest   Switch breasts when the baby takes himself off the breast or falls asleep   Keep offering breasts until the baby looks full, no longer gives hunger signs, and stays asleep when placed on his back in the crib   If the baby is sleepy and wont wake for a feeding, put the baby skin-to-skin dressed in a diaper against the mothers bare chest   Sleep near your baby   The baby should be positioned and latched on to the breast correctly  o Chest-to-chest, chin in the breast  o Babys lips are flipped outward  o Babys mouth is stretched open wide like a shout  o Babys sucking should feel like tugging to the mother  - The baby should be drinking at the breast:  o You should hear swallowing or gulping throughout the feeding  o You should see milk on the babys lips when he comes off the breast  o Your breasts should be softer when the baby is finished feeding  o The baby should look relaxed at the end of feedings  o After the 4th day and your milk is in:  o The babys poop should turn bright yellow and be loose, watery, and seedy  o The baby should have at least 3-4 poops the size of the palm of your hand per day  o The baby should have at least 5-6 wet diapers per day  o The urine should be light yellow in color  You should drink when you are thirsty and eat a healthy diet when you are    hungry.     Take naps to get the rest you need.   Take medications and/or drink alcohol only with permission of your obstetrician    or the babys pediatrician.  You can  also call the [facility],   ([phone]), Monday-Friday, 8am-5pm Central time, to get the most   up-to-date evidence-based information on the use of medications during   pregnancy and breastfeeding.      The baby should be examined by a pediatrician at 3-5 days of age.  Once your   milk comes in, the baby should be gaining at least ½ - 1oz each day and should be back to birthweight no later than 10-14 days of age.          Community Resources    [facility] Breastfeeding Warmline: [phone]   Local [facility] clinics: provide incentives and breastpumps to eligible mothers  [employer] ([employer]LI):  mother-to-mother support group website        www.Accedol.SwiftKey  Local [employer] mother-to-mother support groups:        www.Pulmatrix        [facility]   Dr. Vincent Boudreaux website for latch videos and general information:        www.breastfeedinginc.ca  [facility] is a call center that provides information about the safety of taking medications while breastfeeding.  Call [phone], M-F, 8am-5pm, [place].  International Lactation Consultant Association provides resources for assistance:        www.ilca.org  [place] Breastfeeding [employer] provides informationand resources for parents  and the community    www.LaBreastfeedingSupport.org     Alycia Kelly is a mom-to-mom support group:                             www.[employer].com//breastfeedng-support/  [employer] for Healthy Babies:  0-238-442-BABY(6576)  [employer]: a breastfeeding support group for women of color, [phone]

## 2018-11-15 NOTE — PROGRESS NOTES
Dr. Moody notified of pt's 2 hr post prandial blood sugar of 233. No new orders at this time. Will continue to monitor.

## 2018-11-15 NOTE — PHYSICIAN QUERY
PT Name: Stephanie Russell  MR #: [id number]     Physician Query Form - Documentation Clarification      CDS/: SUPRIYA Troy,RNC-[email or web address]           Contact information:[email or web address]    This form is a permanent document in the medical record.     Query Date: November 15, 2018    By submitting this query, we are merely seeking further clarification of documentation. Please utilize your independent clinical judgment when addressing the question(s) below.    The Medical record reflects the following:    Supporting Clinical Findings Location in Medical Record    female with IUP at 37w0d weeks gestation who presents for scheduled Repeat  section    She has type 2 diabetes mellitus and is currently on a regimen of NPH 70 in the AM and regular X.     Active Hospital Problems  Insulin controlled gestational diabetes mellitus (GDM) during pregnancy     Type 2 Diabetes mellitus  -  on admission  - Last A1C on 18 was 7.3  - NPH 70 in AM  - Regular insulin /X   H&P                                                                                       Doctor, Please clarify conflicting documentation of type of Diabetes Mellitus complicating childbirth.    Provider Use Only      [ X Pre-existing Type 2 Diabetes Mellitus  [  ] Insulin controlled Gestational Diabetes Mellitus  [  ] Other, please specify:________________________________________                                                                                                                    Clinically Undetermined

## 2018-11-15 NOTE — LACTATION NOTE
This note was copied from a baby's chart.  Mother has requested the use of formula. Education provided on the risk of formula feeding and risk of supplementation when breastfeeding. Listened to mothers concerns. Honored mothers request. Mother instructed to always attempt to breastfeed first before offering formula. Education provided on alternative feeding methods and risk of bottles. Instructions given on milk storage, proper mixing, and cleaning of feeding supplies. Questions/Concerns answered. Mother verbalized understanding.

## 2018-11-15 NOTE — PROGRESS NOTES
11/15/18 1050   Maternal Infant Feeding   Maternal Emotional State assist needed;relaxed   Time Spent (min) 0-15 min   Latch Assistance (to call)   lactation rounds. Pt to call for latch assistance and basic education with next feeding.

## 2018-11-16 LAB
POCT GLUCOSE: 104 MG/DL (ref 70–110)
POCT GLUCOSE: 138 MG/DL (ref 70–110)
POCT GLUCOSE: 152 MG/DL (ref 70–110)
POCT GLUCOSE: 153 MG/DL (ref 70–110)
POCT GLUCOSE: 197 MG/DL (ref 70–110)
POCT GLUCOSE: 95 MG/DL (ref 70–110)

## 2018-11-16 PROCEDURE — 63600175 PHARM REV CODE 636 W HCPCS: Performed by: STUDENT IN AN ORGANIZED HEALTH CARE EDUCATION/TRAINING PROGRAM

## 2018-11-16 PROCEDURE — 25000003 PHARM REV CODE 250: Performed by: STUDENT IN AN ORGANIZED HEALTH CARE EDUCATION/TRAINING PROGRAM

## 2018-11-16 PROCEDURE — 99233 SBSQ HOSP IP/OBS HIGH 50: CPT | Mod: ,,, | Performed by: OBSTETRICS & GYNECOLOGY

## 2018-11-16 PROCEDURE — 11000001 HC ACUTE MED/SURG PRIVATE ROOM

## 2018-11-16 RX ORDER — INSULIN ASPART 100 [IU]/ML
13 INJECTION, SOLUTION INTRAVENOUS; SUBCUTANEOUS
Status: DISCONTINUED | OUTPATIENT
Start: 2018-11-16 | End: 2018-11-17 | Stop reason: HOSPADM

## 2018-11-16 RX ORDER — INSULIN ASPART 100 [IU]/ML
12 INJECTION, SOLUTION INTRAVENOUS; SUBCUTANEOUS
Status: DISCONTINUED | OUTPATIENT
Start: 2018-11-16 | End: 2018-11-17 | Stop reason: HOSPADM

## 2018-11-16 RX ADMIN — IBUPROFEN 600 MG: 600 TABLET ORAL at 06:11

## 2018-11-16 RX ADMIN — IBUPROFEN 600 MG: 600 TABLET ORAL at 11:11

## 2018-11-16 RX ADMIN — HUMAN INSULIN 35 UNITS: 100 INJECTION, SUSPENSION SUBCUTANEOUS at 06:11

## 2018-11-16 RX ADMIN — HYDROCODONE BITARTRATE AND ACETAMINOPHEN 1 TABLET: 10; 325 TABLET ORAL at 01:11

## 2018-11-16 RX ADMIN — HYDROCODONE BITARTRATE AND ACETAMINOPHEN 1 TABLET: 10; 325 TABLET ORAL at 05:11

## 2018-11-16 RX ADMIN — DOCUSATE SODIUM 200 MG: 100 CAPSULE, LIQUID FILLED ORAL at 08:11

## 2018-11-16 RX ADMIN — INSULIN ASPART 13 UNITS: 100 INJECTION, SOLUTION INTRAVENOUS; SUBCUTANEOUS at 05:11

## 2018-11-16 RX ADMIN — IBUPROFEN 600 MG: 600 TABLET ORAL at 12:11

## 2018-11-16 RX ADMIN — HYDROCODONE BITARTRATE AND ACETAMINOPHEN 1 TABLET: 10; 325 TABLET ORAL at 09:11

## 2018-11-16 RX ADMIN — INSULIN ASPART 12 UNITS: 100 INJECTION, SOLUTION INTRAVENOUS; SUBCUTANEOUS at 11:11

## 2018-11-16 RX ADMIN — HYDROCODONE BITARTRATE AND ACETAMINOPHEN 1 TABLET: 10; 325 TABLET ORAL at 06:11

## 2018-11-16 RX ADMIN — HYDROCODONE BITARTRATE AND ACETAMINOPHEN 1 TABLET: 10; 325 TABLET ORAL at 11:11

## 2018-11-16 RX ADMIN — INSULIN ASPART 10 UNITS: 100 INJECTION, SOLUTION INTRAVENOUS; SUBCUTANEOUS at 08:11

## 2018-11-16 RX ADMIN — IBUPROFEN 600 MG: 600 TABLET ORAL at 05:11

## 2018-11-16 NOTE — PROGRESS NOTES
POSTPARTUM PROGRESS NOTE     Stephanie Russell is a 43 y.o. female POD #2 status post Repeat  section at 37w0d in a pregnancy complicated by DM2 and maternal obesity. Patient is doing well this morning. She denies nausea, vomiting, fever or chills. Patient reports moderate back and abdominal pain that is adequately relieved by oral pain medications. Lochia is mild and decreasing. She is ambulating and voiding without difficulty. She has passed flatus, and has not had BM. Patient does plan to breast feed. Defer to post partum visit with Dr. Nicholas for contraception. She desires circumcision.    Objective:       Temp:  [97.9 °F (36.6 °C)-98.6 °F (37 °C)] 98.6 °F (37 °C)  Pulse:  [87-95] 95  Resp:  [18-20] 20  SpO2:  [97 %-99 %] 99 %  BP: (122-135)/(60-70) 126/70    General:   alert, appears stated age and cooperative   Lungs:   clear to auscultation bilaterally   Heart:   regular rate and rhythm, S1, S2 normal, no murmur, click, rub or gallop   Abdomen:  soft, non-tender; bowel sounds normal; no masses,  no organomegaly   Uterus:  firm located at the umblicus.    Incision: Clean, dry, and intact   Extremities: peripheral pulses normal, no pedal edema, no clubbing or cyanosis     Lab Review  Recent Results (from the past 4 hour(s))   POCT glucose    Collection Time: 18  6:24 AM   Result Value Ref Range    POCT Glucose 95 70 - 110 mg/dL       I/O    Intake/Output Summary (Last 24 hours) at 2018 0639  Last data filed at 11/15/2018 2148  Gross per 24 hour   Intake --   Output 1300 ml   Net -1300 ml        Assessment:     Patient Active Problem List   Diagnosis    AMA (advanced maternal age) multigravida 35+ - needs u/s at 32 weeks    Type 2 diabetes mellitus    Down syndrome in child of prior pregnancy, currently pregnant    H/O  section - classical, needs repeat 36-37 weeks    H/O congenital cardiac septal defect - peds card consult    Obesity    Mild recurrent major depression     History of classical  section    Status post  section        Plan:   1. Postpartum care:  - Patient doing well. Continue routine management and advances.  - Continue PO pain meds. Pain well controlled.  - Heme: H/h: >36.3  - Encourage ambulation  - Circumcision - desired, consents signed  - Contraception defer to post partum visit with Dr. Nicholas  - Lactation consult PRN    2. DM2:  - Asymptomatic  - Current regimen NPH 35u AM, regular 10/10/11  - Will increase regular today to 10/12/13  - Continue to monitor              F   ppBf  ppL  ppD           107   87    98  11/15  132 159  233  145/214    3. Obesity:  - BMI: 39.3  - Encourage TEDs/SCDs and encourage ambulation      Dispo: As patient meets milestones, will plan to discharge POD#3-4    Olivia Esparza MD  OBGYN, PGY-1

## 2018-11-16 NOTE — LACTATION NOTE
"Pt states that latching is "going well" and denies any breast or nipple pain. This is the pt's first time BFing at breast (pumped for last baby). Pt declined assistance with latching at this time. Lactation discharge education completed. Pt verbalizes understanding. Plan of care is for pt to follow basic breastfeeding education, frequent feeding on demand, and to monitor baby's voids and stools. Breastfeeding guide, including First Alert survey, resource list, and lactation warmline phone number reviewed. Pt to notify doctor for maternal or infant concerns, as reviewed with LC.   "

## 2018-11-16 NOTE — PLAN OF CARE
Problem: Patient Care Overview  Goal: Plan of Care Review  Outcome: Outcome(s) achieved Date Met: 11/16/18  Based on pt's stated feeding goals, the Plan of care for discharge is for pt to do skin-to-skin, to feed frequently on demand, to observe for signs of effective milk transfer, to monitor voids and stools, and to call for assistance. Pt may breastfeed and/ or express breastmilk with hand expression, and provide EBM to baby with cup, or spoon, as needed. Pt verbalizes understanding.

## 2018-11-17 VITALS
HEART RATE: 82 BPM | HEIGHT: 70 IN | TEMPERATURE: 98 F | RESPIRATION RATE: 18 BRPM | WEIGHT: 274.5 LBS | BODY MASS INDEX: 39.3 KG/M2 | OXYGEN SATURATION: 97 % | SYSTOLIC BLOOD PRESSURE: 114 MMHG | DIASTOLIC BLOOD PRESSURE: 68 MMHG

## 2018-11-17 LAB
POCT GLUCOSE: 129 MG/DL (ref 70–110)
POCT GLUCOSE: 239 MG/DL (ref 70–110)
POCT GLUCOSE: 83 MG/DL (ref 70–110)
POCT GLUCOSE: 89 MG/DL (ref 70–110)
POCT GLUCOSE: 91 MG/DL (ref 70–110)

## 2018-11-17 PROCEDURE — 99232 SBSQ HOSP IP/OBS MODERATE 35: CPT | Mod: ,,, | Performed by: OBSTETRICS & GYNECOLOGY

## 2018-11-17 PROCEDURE — 25000003 PHARM REV CODE 250: Performed by: STUDENT IN AN ORGANIZED HEALTH CARE EDUCATION/TRAINING PROGRAM

## 2018-11-17 PROCEDURE — 63600175 PHARM REV CODE 636 W HCPCS: Performed by: STUDENT IN AN ORGANIZED HEALTH CARE EDUCATION/TRAINING PROGRAM

## 2018-11-17 RX ORDER — INSULIN ASPART 100 [IU]/ML
13 INJECTION, SOLUTION INTRAVENOUS; SUBCUTANEOUS
Qty: 3.9 ML | Refills: 11 | Status: SHIPPED | OUTPATIENT
Start: 2018-11-17 | End: 2019-11-17

## 2018-11-17 RX ORDER — INSULIN ASPART 100 [IU]/ML
12 INJECTION, SOLUTION INTRAVENOUS; SUBCUTANEOUS
Qty: 15 ML | Refills: 11 | Status: SHIPPED | OUTPATIENT
Start: 2018-11-18 | End: 2019-11-18

## 2018-11-17 RX ADMIN — IBUPROFEN 600 MG: 600 TABLET ORAL at 12:11

## 2018-11-17 RX ADMIN — HYDROCODONE BITARTRATE AND ACETAMINOPHEN 1 TABLET: 10; 325 TABLET ORAL at 09:11

## 2018-11-17 RX ADMIN — DOCUSATE SODIUM 200 MG: 100 CAPSULE, LIQUID FILLED ORAL at 08:11

## 2018-11-17 RX ADMIN — IBUPROFEN 600 MG: 600 TABLET ORAL at 06:11

## 2018-11-17 RX ADMIN — HUMAN INSULIN 35 UNITS: 100 INJECTION, SUSPENSION SUBCUTANEOUS at 06:11

## 2018-11-17 RX ADMIN — INSULIN ASPART 10 UNITS: 100 INJECTION, SOLUTION INTRAVENOUS; SUBCUTANEOUS at 08:11

## 2018-11-17 RX ADMIN — HYDROCODONE BITARTRATE AND ACETAMINOPHEN 1 TABLET: 10; 325 TABLET ORAL at 03:11

## 2018-11-17 RX ADMIN — INSULIN ASPART 12 UNITS: 100 INJECTION, SOLUTION INTRAVENOUS; SUBCUTANEOUS at 11:11

## 2018-11-17 NOTE — PLAN OF CARE
Problem: Patient Care Overview  Goal: Plan of Care Review  Patient states pain controlled with ordered medications. Bleeding light, no clots. Formula feeding without assistance. BG WDL. Ambulating and toileting without assistance. Reviewed discharge teaching and current insulin regimen. Patient verbalizes understanding and readiness for discharge.

## 2018-11-17 NOTE — PROGRESS NOTES
POSTPARTUM PROGRESS NOTE     Stephanie Russell is a 43 y.o. female POD #3 status post Repeat  section at 37w0d in a pregnancy complicated by DM2 and maternal obesity. Patient is doing well this morning. She denies nausea, vomiting, fever or chills. Patient reports moderate back and abdominal pain that is adequately relieved by oral pain medications. Lochia is mild and decreasing. She is ambulating and voiding without difficulty. She has passed flatus, and has not had BM. Patient does plan to breast feed. Contraception per Dr. Nicholas. She desires circumcision.    Objective:       Temp:  [97.7 °F (36.5 °C)-98.8 °F (37.1 °C)] 98.7 °F (37.1 °C)  Pulse:  [81-87] 87  Resp:  [18-19] 18  SpO2:  [99 %] 99 %  BP: (120-150)/(61-70) 150/70    General:   alert, appears stated age and cooperative   Lungs:   clear to auscultation bilaterally   Heart:   regular rate and rhythm, S1, S2 normal, no murmur, click, rub or gallop   Abdomen:  soft, non-tender; bowel sounds normal; no masses,  no organomegaly   Uterus:  firm located at the umblicus.    Incision: Clean, dry, and intact   Extremities: peripheral pulses normal, no pedal edema, no clubbing or cyanosis     Lab Review  No results found for this or any previous visit (from the past 4 hour(s)).    I/O  No intake or output data in the 24 hours ending 18 0519     Assessment:     Patient Active Problem List   Diagnosis    AMA (advanced maternal age) multigravida 35+ - needs u/s at 32 weeks    Type 2 diabetes mellitus    Down syndrome in child of prior pregnancy, currently pregnant    H/O  section - classical, needs repeat 36-37 weeks    H/O congenital cardiac septal defect - peds card consult    Obesity    Mild recurrent major depression    History of classical  section    Status post  section        Plan:   1. Postpartum care:  - Patient doing well. Continue routine management and advances.  - Continue PO pain meds. Pain well  controlled.  - Heme: H/h: 13/37>12/36.3  - Encourage ambulation  - Circumcision - desired, consents signed  - Contraception defer to post partum visit with Dr. Nicholas  - Lactation consult PRN    2. DM2:  - Asymptomatic  - Current regimen NPH 35u AM, regular 10/10/11  - Increase regular yesterday to 10/12/13  - Continue to monitor              F   ppBf  ppL  ppD  11/14         107   87    98  11/15  132 159  233  145/214  11/16   95   104  152   197/153    3. Obesity:  - BMI: 39.3  - Encourage TEDs/SCDs and encourage ambulation      Dispo: As patient meets milestones, will plan to discharge today.    Ananya Amezcua MD  OBGYN PGY-1

## 2018-11-17 NOTE — DISCHARGE SUMMARY
Delivery Discharge Summary  Obstetrics      Primary OB Clinician: Lissa Nicholas MD      Admission date: 2018  Discharge date: 2018    Disposition: To home, self care    Discharge Diagnosis List:      Patient Active Problem List   Diagnosis    AMA (advanced maternal age) multigravida 35+ - needs u/s at 32 weeks    Type 2 diabetes mellitus    Down syndrome in child of prior pregnancy, currently pregnant    H/O  section - classical, needs repeat 36-37 weeks    H/O congenital cardiac septal defect - peds card consult    Obesity    Mild recurrent major depression    History of classical  section    Status post  section       Procedure: , due to history of  x3    Hospital Course:  Stephanie Marley Russell is a 43 y.o. now , POD #3 who was admitted on 2018 at 37w0d for scheduled repeat . Patient was subsequently admitted to labor and delivery unit with signed consents.     Please see delivery note for further details. Her postpartum course was uncomplicated. On discharge day, patient's pain is controlled with oral pain medications. Pt is tolerating ambulation without SOB or CP, and regular diet without N/V. Reports lochia is mild. Denies any HA, vision changes, F/C, LE swelling. Denies any breast pain/soreness.    Pt in stable condition and ready for discharge. She has been instructed to start and/or continue medications and follow up with her obstetrics provider as listed below.    Pertinent studies:  Postpartum CBC  Lab Results   Component Value Date    WBC 9.95 11/15/2018    HGB 12.0 11/15/2018    HCT 36.3 (L) 11/15/2018    MCV 83 11/15/2018     11/15/2018       There is no immunization history for the selected administration types on file for this patient.     Delivery:    Episiotomy: None   Lacerations: None   Repair suture:     Repair # of packets:     Blood loss (ml): 0     Birth information:  YOB: 2017   Time  of birth: 2:41 PM   Sex: male   Delivery type: , Classical   Gestational Age: 37w0d    Delivery Clinician:      Other providers:       Additional  information:  Forceps:    Vacuum:    Breech:    Observed anomalies      Living?:           APGARS  One minute Five minutes Ten minutes   Skin color:         Heart rate:         Grimace:         Muscle tone:         Breathing:         Totals: 9  9        Placenta: Delivered:       appearance      Patient Instructions:   Current Discharge Medication List      START taking these medications    Details   HYDROcodone-acetaminophen (NORCO) 5-325 mg per tablet Take 1 tablet by mouth every 4 (four) hours as needed.  Qty: 20 tablet, Refills: 0      ibuprofen (ADVIL,MOTRIN) 600 MG tablet Take 1 tablet (600 mg total) by mouth every 6 (six) hours.  Qty: 30 tablet, Refills: 1      !! insulin aspart U-100 (NOVOLOG) 100 unit/mL InPn pen Inject 12 Units into the skin with lunch.  Qty: 3.6 mL, Refills: 11      !! insulin aspart U-100 (NOVOLOG) 100 unit/mL InPn pen Inject 13 Units into the skin before dinner.  Qty: 3.9 mL, Refills: 11       !! - Potential duplicate medications found. Please discuss with provider.      CONTINUE these medications which have NOT CHANGED    Details   insulin NPH (NOVOLIN N) 100 unit/mL injection Inject 70 Units into the skin before breakfast.       insulin regular 100 unit/mL Inj injection Inject 18 Units into the skin 3 (three) times daily before meals.       NESTABS 32-1,000 mg-mcg Tab       terconazole (TERAZOL 7) 0.4 % Crea Place 1 applicator vaginally once daily.  Qty: 45 g, Refills: 0    Associated Diagnoses: Candida albicans infection             Discharge Procedure Orders   Other restrictions (specify):   Order Comments: Nothing in the vagina for six weeks     Notify your health care provider if you experience any of the following:  temperature >100.4     Notify your health care provider if you experience any of the following:  persistent  nausea and vomiting or diarrhea     Notify your health care provider if you experience any of the following:  severe uncontrolled pain     Notify your health care provider if you experience any of the following:  redness, tenderness, or signs of infection (pain, swelling, redness, odor or green/yellow discharge around incision site)     Notify your health care provider if you experience any of the following:  difficulty breathing or increased cough     Notify your health care provider if you experience any of the following:  severe persistent headache     Notify your health care provider if you experience any of the following:  persistent dizziness, light-headedness, or visual disturbances     Notify your health care provider if you experience any of the following:  increased confusion or weakness     Notify your health care provider if you experience any of the following:   Order Comments: Heavy vaginal bleeding >1 pad/hour for greater than 2 hours     Activity as tolerated       Follow-up Information     [facility] In 6 weeks.    Specialty:  Obstetrics and Gynecology  Why:  post partum visit  Contact information:  [address] [place] [id number] [phone]           [facility] In 1 week.    Specialty:  Obstetrics and Gynecology  Why:  blood sugar assessment   Contact information:  [facility] [facility] [address] [phone]                  Kylee Alaniz MD  OBGYN, PGY-1

## 2018-11-19 RX ORDER — PEN NEEDLE, DIABETIC 29 G X1/2"
1 NEEDLE, DISPOSABLE MISCELLANEOUS 2 TIMES DAILY
Qty: 1 EACH | Refills: 0 | COMMUNITY
Start: 2018-11-19

## 2018-11-19 RX ORDER — INSULIN LISPRO 100 [IU]/ML
12 INJECTION, SOLUTION INTRAVENOUS; SUBCUTANEOUS
Qty: 10 ML | Refills: 11 | Status: SHIPPED | OUTPATIENT
Start: 2018-11-19 | End: 2019-11-19

## 2018-11-19 RX ORDER — INSULIN LISPRO 100 [IU]/ML
13 INJECTION, SOLUTION INTRAVENOUS; SUBCUTANEOUS
Qty: 10 ML | Refills: 11 | Status: SHIPPED | OUTPATIENT
Start: 2018-11-19 | End: 2019-11-19

## 2018-11-20 LAB — FUNGUS SPEC CULT: NORMAL

## 2018-11-28 ENCOUNTER — POSTPARTUM VISIT (OUTPATIENT)
Dept: OBSTETRICS AND GYNECOLOGY | Facility: CLINIC | Age: 43
End: 2018-11-28
Payer: MEDICAID

## 2018-11-28 VITALS
BODY MASS INDEX: 45.07 KG/M2 | WEIGHT: 244.94 LBS | DIASTOLIC BLOOD PRESSURE: 80 MMHG | HEIGHT: 62 IN | SYSTOLIC BLOOD PRESSURE: 131 MMHG

## 2018-11-28 DIAGNOSIS — Z98.891 STATUS POST CESAREAN SECTION: Primary | ICD-10-CM

## 2018-11-28 PROBLEM — Z87.74: Status: RESOLVED | Noted: 2018-08-21 | Resolved: 2018-11-28

## 2018-11-28 PROBLEM — O09.529 AMA (ADVANCED MATERNAL AGE) MULTIGRAVIDA 35+: Status: RESOLVED | Noted: 2018-08-21 | Resolved: 2018-11-28

## 2018-11-28 PROBLEM — O09.299 DOWN SYNDROME IN CHILD OF PRIOR PREGNANCY, CURRENTLY PREGNANT: Status: RESOLVED | Noted: 2018-08-21 | Resolved: 2018-11-28

## 2018-11-28 PROCEDURE — 99212 OFFICE O/P EST SF 10 MIN: CPT | Mod: PBBFAC,PO | Performed by: STUDENT IN AN ORGANIZED HEALTH CARE EDUCATION/TRAINING PROGRAM

## 2018-11-28 PROCEDURE — 99999 PR PBB SHADOW E&M-EST. PATIENT-LVL II: CPT | Mod: PBBFAC,,, | Performed by: STUDENT IN AN ORGANIZED HEALTH CARE EDUCATION/TRAINING PROGRAM

## 2018-11-28 RX ORDER — SULFAMETHOXAZOLE AND TRIMETHOPRIM 400; 80 MG/1; MG/1
1 TABLET ORAL 2 TIMES DAILY
Qty: 14 TABLET | Refills: 0 | Status: SHIPPED | OUTPATIENT
Start: 2018-11-28 | End: 2018-12-05

## 2018-11-29 NOTE — PROGRESS NOTES
Subjective:       Patient ID: Stephanie Russell is a 43 y.o. female.    Chief Complaint: Follow-up    HPI Denies complaints.    Review of Systems    ROS:  GENERAL: No fever, chills, fatigability or weight loss.  VULVAR: No pain, no lesions and no itching.  VAGINAL: No relaxation, no itching, no discharge, no abnormal bleeding and no lesions.  ABDOMEN: No abdominal pain. Denies nausea. Denies vomiting. No diarrhea. No constipation  BREAST: Denies pain. No lumps. No discharge.  URINARY: No incontinence, no nocturia, no frequency and no dysuria.  CARDIOVASCULAR: No chest pain. No shortness of breath. No leg cramps.  NEUROLOGICAL: no headaches. No vision changes.    Objective:      Physical Exam   Constitutional: She is oriented to person, place, and time. She appears well-developed and well-nourished.   Abdominal: She exhibits no mass. There is no hepatomegaly. No hernia.   Incision with some erythema.   Neurological: She is alert and oriented to person, place, and time.   Psychiatric: She has a normal mood and affect. Her behavior is normal. Judgment and thought content normal.       Assessment:       1. Status post  section        Plan:       Will treat with bactrim for a week

## 2018-12-18 ENCOUNTER — POSTPARTUM VISIT (OUTPATIENT)
Dept: OBSTETRICS AND GYNECOLOGY | Facility: CLINIC | Age: 43
End: 2018-12-18
Payer: MEDICAID

## 2018-12-18 VITALS
WEIGHT: 245.13 LBS | BODY MASS INDEX: 35.09 KG/M2 | HEIGHT: 70 IN | DIASTOLIC BLOOD PRESSURE: 70 MMHG | SYSTOLIC BLOOD PRESSURE: 120 MMHG

## 2018-12-18 PROCEDURE — 99999 PR PBB SHADOW E&M-EST. PATIENT-LVL III: CPT | Mod: PBBFAC,,, | Performed by: STUDENT IN AN ORGANIZED HEALTH CARE EDUCATION/TRAINING PROGRAM

## 2018-12-18 PROCEDURE — 99213 OFFICE O/P EST LOW 20 MIN: CPT | Mod: PBBFAC,PO | Performed by: STUDENT IN AN ORGANIZED HEALTH CARE EDUCATION/TRAINING PROGRAM

## 2018-12-18 RX ORDER — SYRINGE,SAFETY WITH NEEDLE,1ML 25GX1"
SYRINGE (EA) MISCELLANEOUS
COMMUNITY
Start: 2018-11-12

## 2018-12-18 NOTE — PROGRESS NOTES
"Stephanie Russell is a 43 y.o. female  presents for a postpartum visit.  She is status post repeat  section 6 weeks ago.  Her hospitalization was not complicated.  She is not breastfeeding.  Her  had a vasectomy for contraception.  She denies postpartum depression.    Her last pap was 3/28/2017    Past Medical History:   Diagnosis Date    Diabetes mellitus      Past Surgical History:   Procedure Laterality Date     SECTION       SECTION N/A 2018    Procedure:  SECTION;  Surgeon: Frederick Etienne III, MD;  Location: [facility];  Service: OB/GYN;  Laterality: N/A;     SECTION N/A 2018    Performed by Frederick Etienne III, MD at [facility]    DELIVERY- SECTION Bilateral 3/28/2017    Performed by Shilo Nunes III, MD at [facility]    ECTOPIC PREGNANCY SURGERY       Review of patient's allergies indicates:  No Known Allergies    Current Outpatient Medications:     insulin aspart U-100 (NOVOLOG) 100 unit/mL InPn pen, Inject 12 Units into the skin with lunch., Disp: 15 mL, Rfl: 11    insulin aspart U-100 (NOVOLOG) 100 unit/mL InPn pen, Inject 13 Units into the skin before dinner., Disp: 3.9 mL, Rfl: 11    insulin lispro (HUMALOG) 100 unit/mL injection, Inject 12 Units into the skin with lunch., Disp: 10 mL, Rfl: 11    insulin lispro (HUMALOG) 100 unit/mL injection, Inject 13 Units into the skin before dinner., Disp: 10 mL, Rfl: 11    insulin NPH (NOVOLIN N) 100 unit/mL injection, Inject 70 Units into the skin before breakfast. , Disp: , Rfl:     insulin regular 100 unit/mL Inj injection, Inject 18 Units into the skin 3 (three) times daily before meals. , Disp: , Rfl:     insulin syringe-needle U-100 0.3 mL 30 gauge x 5/16" Syrg, 1 Box by Misc.(Non-Drug; Combo Route) route 2 (two) times daily., Disp: 1 each, Rfl: 0    insulin syringe-needle U-100 0.5 mL 31 gauge x 5/16" Syrg, use for daily insulin injections, Disp: 100 each, Rfl: " 3    insulin syringe-needle U-100 1 mL 31 gauge x 5/16 Syrg, , Disp: , Rfl:       Vitals:    12/18/18 1156   BP: 120/70       GENERAL: healthy, alert, no distress, smiling  ABDOMEN: Normal, benign. and no masses, hepatosplenomegaly, no hernias  EXTERNAL GENITALIA POSTPARTUM: normal, well-healed, without lesions or masses   VAGINA POSTPARTUM: normal, well-healed, physiologic discharge, without lesions   CERVIX POSTPARTUM: normal, well-healed, without lesions   UTERUS POSTPARTUM: normal size, well involuted, firm, non-tender, ADNEXA POSTPARTUM: no masses palpable and nontender    Assessment:  Normal postpartum exam    Plan:  Routine follow up.    Ananya Amezcua MD  OBGYN PGY-1

## 2025-07-07 ENCOUNTER — OCCUPATIONAL HEALTH (OUTPATIENT)
Dept: URGENT CARE | Facility: CLINIC | Age: 50
End: 2025-07-07

## 2025-07-07 DIAGNOSIS — Z02.83 ENCOUNTER FOR DRUG SCREENING: Primary | ICD-10-CM

## 2025-07-07 NOTE — PROGRESS NOTES
Patient presented to clinic for a Random DCFS drug screen. The following were collected Hair, Urine. Urine was observed.     Verified by  Lele